# Patient Record
Sex: FEMALE | Race: WHITE | NOT HISPANIC OR LATINO | URBAN - METROPOLITAN AREA
[De-identification: names, ages, dates, MRNs, and addresses within clinical notes are randomized per-mention and may not be internally consistent; named-entity substitution may affect disease eponyms.]

---

## 2017-01-10 ENCOUNTER — EMERGENCY (EMERGENCY)
Facility: HOSPITAL | Age: 82
LOS: 1 days | Discharge: HOME | End: 2017-01-10
Attending: EMERGENCY MEDICINE | Admitting: EMERGENCY MEDICINE
Payer: MEDICARE

## 2017-01-10 VITALS
HEART RATE: 83 BPM | RESPIRATION RATE: 20 BRPM | DIASTOLIC BLOOD PRESSURE: 76 MMHG | OXYGEN SATURATION: 100 % | SYSTOLIC BLOOD PRESSURE: 154 MMHG

## 2017-01-10 VITALS — WEIGHT: 156.09 LBS

## 2017-01-10 DIAGNOSIS — F32.9 MAJOR DEPRESSIVE DISORDER, SINGLE EPISODE, UNSPECIFIED: ICD-10-CM

## 2017-01-10 DIAGNOSIS — E07.89 OTHER SPECIFIED DISORDERS OF THYROID: ICD-10-CM

## 2017-01-10 DIAGNOSIS — R07.89 OTHER CHEST PAIN: ICD-10-CM

## 2017-01-10 DIAGNOSIS — E86.0 DEHYDRATION: ICD-10-CM

## 2017-01-10 DIAGNOSIS — E78.5 HYPERLIPIDEMIA, UNSPECIFIED: ICD-10-CM

## 2017-01-10 DIAGNOSIS — N30.00 ACUTE CYSTITIS WITHOUT HEMATURIA: ICD-10-CM

## 2017-01-10 DIAGNOSIS — E11.9 TYPE 2 DIABETES MELLITUS WITHOUT COMPLICATIONS: ICD-10-CM

## 2017-01-10 DIAGNOSIS — I10 ESSENTIAL (PRIMARY) HYPERTENSION: ICD-10-CM

## 2017-01-10 LAB
ALBUMIN SERPL ELPH-MCNC: 4.5 G/DL — SIGNIFICANT CHANGE UP (ref 3.3–5.2)
ALP SERPL-CCNC: 92 U/L — SIGNIFICANT CHANGE UP (ref 40–120)
ALT FLD-CCNC: 13 U/L — SIGNIFICANT CHANGE UP
ANION GAP SERPL CALC-SCNC: 17 MMOL/L — SIGNIFICANT CHANGE UP (ref 5–17)
APPEARANCE UR: CLEAR — SIGNIFICANT CHANGE UP
APTT BLD: 30.6 SEC — SIGNIFICANT CHANGE UP (ref 27.5–37.4)
AST SERPL-CCNC: 13 U/L — SIGNIFICANT CHANGE UP
BACTERIA # UR AUTO: ABNORMAL
BASOPHILS # BLD AUTO: 0.1 K/UL — SIGNIFICANT CHANGE UP (ref 0–0.2)
BASOPHILS NFR BLD AUTO: 1 % — SIGNIFICANT CHANGE UP (ref 0–2)
BILIRUB SERPL-MCNC: 0.4 MG/DL — SIGNIFICANT CHANGE UP (ref 0.4–2)
BILIRUB UR-MCNC: NEGATIVE — SIGNIFICANT CHANGE UP
BUN SERPL-MCNC: 24 MG/DL — HIGH (ref 8–20)
CALCIUM SERPL-MCNC: 9.2 MG/DL — SIGNIFICANT CHANGE UP (ref 8.6–10.2)
CHLORIDE SERPL-SCNC: 93 MMOL/L — LOW (ref 98–107)
CO2 SERPL-SCNC: 20 MMOL/L — LOW (ref 22–29)
COLOR SPEC: YELLOW — SIGNIFICANT CHANGE UP
COMMENT - URINE: SIGNIFICANT CHANGE UP
CREAT SERPL-MCNC: 1.39 MG/DL — HIGH (ref 0.5–1.3)
DIFF PNL FLD: ABNORMAL
EOSINOPHIL # BLD AUTO: 0.3 K/UL — SIGNIFICANT CHANGE UP (ref 0–0.5)
EOSINOPHIL NFR BLD AUTO: 2 % — SIGNIFICANT CHANGE UP (ref 0–5)
EPI CELLS # UR: SIGNIFICANT CHANGE UP
GLUCOSE SERPL-MCNC: 152 MG/DL — HIGH (ref 70–115)
GLUCOSE UR QL: NEGATIVE MG/DL — SIGNIFICANT CHANGE UP
HCT VFR BLD CALC: 32.8 % — LOW (ref 37–47)
HGB BLD-MCNC: 11 G/DL — LOW (ref 12–16)
INR BLD: 1.09 RATIO — SIGNIFICANT CHANGE UP (ref 0.88–1.16)
KETONES UR-MCNC: NEGATIVE — SIGNIFICANT CHANGE UP
LEUKOCYTE ESTERASE UR-ACNC: ABNORMAL
LIDOCAIN IGE QN: 35 U/L — SIGNIFICANT CHANGE UP (ref 22–51)
LYMPHOCYTES # BLD AUTO: 31 % — SIGNIFICANT CHANGE UP (ref 20–55)
LYMPHOCYTES # BLD AUTO: 5.6 K/UL — HIGH (ref 1–4.8)
MCHC RBC-ENTMCNC: 29.9 PG — SIGNIFICANT CHANGE UP (ref 27–31)
MCHC RBC-ENTMCNC: 33.5 G/DL — SIGNIFICANT CHANGE UP (ref 32–36)
MCV RBC AUTO: 89.1 FL — SIGNIFICANT CHANGE UP (ref 81–99)
MONOCYTES # BLD AUTO: 1.2 K/UL — HIGH (ref 0–0.8)
MONOCYTES NFR BLD AUTO: 8 % — SIGNIFICANT CHANGE UP (ref 3–10)
NEUTROPHILS # BLD AUTO: 8.7 K/UL — HIGH (ref 1.8–8)
NEUTROPHILS NFR BLD AUTO: 56 % — SIGNIFICANT CHANGE UP (ref 37–73)
NEUTS BAND # BLD: 2 % — SIGNIFICANT CHANGE UP (ref 0–8)
NITRITE UR-MCNC: POSITIVE
NT-PROBNP SERPL-SCNC: 97 PG/ML — SIGNIFICANT CHANGE UP (ref 0–300)
PH UR: 5 — SIGNIFICANT CHANGE UP (ref 4.8–8)
PLAT MORPH BLD: NORMAL — SIGNIFICANT CHANGE UP
PLATELET # BLD AUTO: 453 K/UL — HIGH (ref 150–400)
POIKILOCYTOSIS BLD QL AUTO: SLIGHT — SIGNIFICANT CHANGE UP
POTASSIUM SERPL-MCNC: 4.3 MMOL/L — SIGNIFICANT CHANGE UP (ref 3.5–5.3)
POTASSIUM SERPL-SCNC: 4.3 MMOL/L — SIGNIFICANT CHANGE UP (ref 3.5–5.3)
PROT SERPL-MCNC: 7.8 G/DL — SIGNIFICANT CHANGE UP (ref 6.6–8.7)
PROT UR-MCNC: 15 MG/DL
PROTHROM AB SERPL-ACNC: 12 SEC — SIGNIFICANT CHANGE UP (ref 10–13.1)
RBC # BLD: 3.68 M/UL — LOW (ref 4.4–5.2)
RBC # FLD: 13.5 % — SIGNIFICANT CHANGE UP (ref 11–15.6)
RBC BLD AUTO: ABNORMAL
RBC CASTS # UR COMP ASSIST: ABNORMAL /HPF (ref 0–4)
SODIUM SERPL-SCNC: 130 MMOL/L — LOW (ref 135–145)
SP GR SPEC: 1.01 — SIGNIFICANT CHANGE UP (ref 1.01–1.02)
TROPONIN T SERPL-MCNC: <0.01 NG/ML — SIGNIFICANT CHANGE UP (ref 0–0.06)
UROBILINOGEN FLD QL: NEGATIVE MG/DL — SIGNIFICANT CHANGE UP
WBC # BLD: 15.84 K/UL — HIGH (ref 4.8–10.8)
WBC # FLD AUTO: 15.84 K/UL — HIGH (ref 4.8–10.8)
WBC UR QL: ABNORMAL

## 2017-01-10 PROCEDURE — 81001 URINALYSIS AUTO W/SCOPE: CPT

## 2017-01-10 PROCEDURE — 93010 ELECTROCARDIOGRAM REPORT: CPT

## 2017-01-10 PROCEDURE — 85027 COMPLETE CBC AUTOMATED: CPT

## 2017-01-10 PROCEDURE — 99284 EMERGENCY DEPT VISIT MOD MDM: CPT | Mod: 25

## 2017-01-10 PROCEDURE — 93005 ELECTROCARDIOGRAM TRACING: CPT

## 2017-01-10 PROCEDURE — 83880 ASSAY OF NATRIURETIC PEPTIDE: CPT

## 2017-01-10 PROCEDURE — 71010: CPT | Mod: 26

## 2017-01-10 PROCEDURE — 83690 ASSAY OF LIPASE: CPT

## 2017-01-10 PROCEDURE — 96374 THER/PROPH/DIAG INJ IV PUSH: CPT

## 2017-01-10 PROCEDURE — 84484 ASSAY OF TROPONIN QUANT: CPT

## 2017-01-10 PROCEDURE — 71045 X-RAY EXAM CHEST 1 VIEW: CPT

## 2017-01-10 PROCEDURE — 99285 EMERGENCY DEPT VISIT HI MDM: CPT

## 2017-01-10 PROCEDURE — 85610 PROTHROMBIN TIME: CPT

## 2017-01-10 PROCEDURE — 80053 COMPREHEN METABOLIC PANEL: CPT

## 2017-01-10 PROCEDURE — 85730 THROMBOPLASTIN TIME PARTIAL: CPT

## 2017-01-10 RX ORDER — NITROFURANTOIN MACROCRYSTAL 50 MG
1 CAPSULE ORAL
Qty: 14 | Refills: 0 | OUTPATIENT
Start: 2017-01-10 | End: 2017-01-17

## 2017-01-10 RX ORDER — NITROFURANTOIN MACROCRYSTAL 50 MG
1 CAPSULE ORAL
Qty: 20 | Refills: 0 | OUTPATIENT
Start: 2017-01-10 | End: 2017-01-20

## 2017-01-10 RX ORDER — SODIUM CHLORIDE 9 MG/ML
1000 INJECTION INTRAMUSCULAR; INTRAVENOUS; SUBCUTANEOUS ONCE
Qty: 0 | Refills: 0 | Status: COMPLETED | OUTPATIENT
Start: 2017-01-10 | End: 2017-01-10

## 2017-01-10 RX ORDER — CEFTRIAXONE 500 MG/1
1 INJECTION, POWDER, FOR SOLUTION INTRAMUSCULAR; INTRAVENOUS ONCE
Qty: 0 | Refills: 0 | Status: COMPLETED | OUTPATIENT
Start: 2017-01-10 | End: 2017-01-10

## 2017-01-10 RX ORDER — ESCITALOPRAM OXALATE 10 MG/1
0 TABLET, FILM COATED ORAL
Qty: 0 | Refills: 0 | COMMUNITY

## 2017-01-10 RX ORDER — SODIUM CHLORIDE 9 MG/ML
3 INJECTION INTRAMUSCULAR; INTRAVENOUS; SUBCUTANEOUS ONCE
Qty: 0 | Refills: 0 | Status: COMPLETED | OUTPATIENT
Start: 2017-01-10 | End: 2017-01-10

## 2017-01-10 RX ORDER — ATORVASTATIN CALCIUM 80 MG/1
0 TABLET, FILM COATED ORAL
Qty: 0 | Refills: 0 | COMMUNITY

## 2017-01-10 RX ADMIN — SODIUM CHLORIDE 3 MILLILITER(S): 9 INJECTION INTRAMUSCULAR; INTRAVENOUS; SUBCUTANEOUS at 12:56

## 2017-01-10 RX ADMIN — CEFTRIAXONE 100 GRAM(S): 500 INJECTION, POWDER, FOR SOLUTION INTRAMUSCULAR; INTRAVENOUS at 15:07

## 2017-01-10 RX ADMIN — SODIUM CHLORIDE 1000 MILLILITER(S): 9 INJECTION INTRAMUSCULAR; INTRAVENOUS; SUBCUTANEOUS at 13:19

## 2017-01-10 NOTE — ED ADULT NURSE REASSESSMENT NOTE - NS ED NURSE REASSESS COMMENT FT1
pt resting comfortably, aware of plan of care, respirations even unlabored, aid at bedside. will continue to monitor.

## 2017-01-10 NOTE — ED PROVIDER NOTE - MEDICAL DECISION MAKING DETAILS
Patient with possible CP yesterday, unsure if she had it.  Patient with no fever, but does have an elevated WBC and positive urine for UTI and dehydration.  PAtient given IVF and IV abx.  Patient with 2 home aids at home, will give her plenty of fluids and give her abx.  Patient will f/u w/o fail.

## 2017-01-10 NOTE — ED ADULT NURSE NOTE - OBJECTIVE STATEMENT
received pt alert and oreintedx3, 2 home aids at bedside, as per aids normal baseline mental status. c/o intermittent chest pain since last night. non radiating no pain currently present. denies sob and difficulty breathing. MAEx4, respirations even unlabored. will continue to monitor.

## 2017-01-10 NOTE — ED ADULT NURSE NOTE - CHPI ED SYMPTOMS NEG
no vomiting/no left arm pain/no chest discomfort/no chest wall tenderness/no diaphoresis/no edema/no nausea/no back pain/no shortness of breath/no cough

## 2017-01-10 NOTE — ED PROVIDER NOTE - OBJECTIVE STATEMENT
PAtient is a 82yo female with h/o dementia and has two home care takers at home with her and when they arrived this morning the patient stated that she had an epidsode of chest pain last night, but is now resolved.  Patient in ED states she does not remember having CP last night and feels well with no complaints currently.  The aids told her to come to the ED to be "checked out" because she had said had CP when they first arrived, even though she denies currently.

## 2018-04-10 ENCOUNTER — APPOINTMENT (OUTPATIENT)
Dept: NEUROLOGY | Facility: CLINIC | Age: 83
End: 2018-04-10
Payer: MEDICARE

## 2018-04-10 VITALS
DIASTOLIC BLOOD PRESSURE: 60 MMHG | HEIGHT: 64 IN | WEIGHT: 150 LBS | BODY MASS INDEX: 25.61 KG/M2 | SYSTOLIC BLOOD PRESSURE: 120 MMHG

## 2018-04-10 DIAGNOSIS — F02.80 ALZHEIMER'S DISEASE WITH LATE ONSET: ICD-10-CM

## 2018-04-10 DIAGNOSIS — G30.1 ALZHEIMER'S DISEASE WITH LATE ONSET: ICD-10-CM

## 2018-04-10 DIAGNOSIS — F51.01 PRIMARY INSOMNIA: ICD-10-CM

## 2018-04-10 DIAGNOSIS — Z86.39 PERSONAL HISTORY OF OTHER ENDOCRINE, NUTRITIONAL AND METABOLIC DISEASE: ICD-10-CM

## 2018-04-10 DIAGNOSIS — Z86.79 PERSONAL HISTORY OF OTHER DISEASES OF THE CIRCULATORY SYSTEM: ICD-10-CM

## 2018-04-10 PROCEDURE — 99204 OFFICE O/P NEW MOD 45 MIN: CPT

## 2018-04-10 RX ORDER — METOPROLOL SUCCINATE 50 MG/1
50 TABLET, EXTENDED RELEASE ORAL
Qty: 90 | Refills: 0 | Status: ACTIVE | COMMUNITY
Start: 2018-02-13

## 2018-04-10 RX ORDER — TRAZODONE HYDROCHLORIDE 50 MG/1
50 TABLET ORAL
Qty: 30 | Refills: 3 | Status: ACTIVE | COMMUNITY
Start: 2018-04-10 | End: 1900-01-01

## 2018-04-10 RX ORDER — NIFEDIPINE 60 MG/1
60 TABLET, FILM COATED, EXTENDED RELEASE ORAL
Qty: 90 | Refills: 0 | Status: ACTIVE | COMMUNITY
Start: 2018-03-20

## 2018-04-10 RX ORDER — RAMIPRIL 10 MG/1
10 CAPSULE ORAL
Qty: 90 | Refills: 0 | Status: ACTIVE | COMMUNITY
Start: 2017-10-16

## 2018-04-10 RX ORDER — ATORVASTATIN CALCIUM 10 MG/1
10 TABLET, FILM COATED ORAL
Qty: 90 | Refills: 0 | Status: ACTIVE | COMMUNITY
Start: 2018-03-20

## 2018-04-10 RX ORDER — METFORMIN HYDROCHLORIDE 1000 MG/1
1000 TABLET, COATED ORAL
Qty: 180 | Refills: 0 | Status: ACTIVE | COMMUNITY
Start: 2017-11-01

## 2018-04-10 RX ORDER — ESCITALOPRAM OXALATE 20 MG/1
20 TABLET ORAL
Qty: 90 | Refills: 0 | Status: ACTIVE | COMMUNITY
Start: 2018-04-06

## 2018-05-22 ENCOUNTER — APPOINTMENT (OUTPATIENT)
Dept: NEUROLOGY | Facility: CLINIC | Age: 83
End: 2018-05-22

## 2018-08-27 ENCOUNTER — RX RENEWAL (OUTPATIENT)
Age: 83
End: 2018-08-27

## 2019-01-12 ENCOUNTER — EMERGENCY (EMERGENCY)
Facility: HOSPITAL | Age: 84
LOS: 1 days | Discharge: HOME | End: 2019-01-12
Attending: EMERGENCY MEDICINE
Payer: MEDICARE

## 2019-01-12 VITALS
HEART RATE: 84 BPM | HEIGHT: 65 IN | OXYGEN SATURATION: 100 % | SYSTOLIC BLOOD PRESSURE: 123 MMHG | RESPIRATION RATE: 16 BRPM | DIASTOLIC BLOOD PRESSURE: 65 MMHG | WEIGHT: 145.06 LBS | TEMPERATURE: 99 F

## 2019-01-12 PROCEDURE — 70450 CT HEAD/BRAIN W/O DYE: CPT

## 2019-01-12 PROCEDURE — 73080 X-RAY EXAM OF ELBOW: CPT | Mod: 26,RT

## 2019-01-12 PROCEDURE — 73060 X-RAY EXAM OF HUMERUS: CPT | Mod: 26,RT

## 2019-01-12 PROCEDURE — 73110 X-RAY EXAM OF WRIST: CPT | Mod: 26,RT

## 2019-01-12 PROCEDURE — 72125 CT NECK SPINE W/O DYE: CPT

## 2019-01-12 PROCEDURE — 73080 X-RAY EXAM OF ELBOW: CPT

## 2019-01-12 PROCEDURE — 73030 X-RAY EXAM OF SHOULDER: CPT

## 2019-01-12 PROCEDURE — 73030 X-RAY EXAM OF SHOULDER: CPT | Mod: 26,RT

## 2019-01-12 PROCEDURE — 73090 X-RAY EXAM OF FOREARM: CPT | Mod: 26,LT

## 2019-01-12 PROCEDURE — 73090 X-RAY EXAM OF FOREARM: CPT

## 2019-01-12 PROCEDURE — 70450 CT HEAD/BRAIN W/O DYE: CPT | Mod: 26

## 2019-01-12 PROCEDURE — 73060 X-RAY EXAM OF HUMERUS: CPT

## 2019-01-12 PROCEDURE — 99284 EMERGENCY DEPT VISIT MOD MDM: CPT

## 2019-01-12 PROCEDURE — 72125 CT NECK SPINE W/O DYE: CPT | Mod: 26

## 2019-01-12 PROCEDURE — 99284 EMERGENCY DEPT VISIT MOD MDM: CPT | Mod: 25

## 2019-01-12 PROCEDURE — 73110 X-RAY EXAM OF WRIST: CPT

## 2019-01-12 RX ORDER — ACETAMINOPHEN 500 MG
650 TABLET ORAL ONCE
Qty: 0 | Refills: 0 | Status: COMPLETED | OUTPATIENT
Start: 2019-01-12 | End: 2019-01-12

## 2019-01-12 RX ORDER — IBUPROFEN 200 MG
1 TABLET ORAL
Qty: 9 | Refills: 0
Start: 2019-01-12 | End: 2019-01-14

## 2019-01-12 RX ADMIN — Medication 650 MILLIGRAM(S): at 11:56

## 2019-01-12 NOTE — ED ADULT NURSE NOTE - NS ED NOTE  TALK SOMEONE YN
Important Medication Changes:none, continue current medications      Further testing to be done:none        Next follow up visit: In office in one year              HERE IS SOME IMPORTANT INFORMATION FOR OUR PATIENTS    If you need refills- call your pharmacist and they will contact our office.    If you have medical concerns after hours call [phone].    If you have a question during office hours call  and to make appointment [phone]. You will eventually speak with my nurses. If you need to speak to me directly, let them know and I will get back to you as soon as possible.  Better yet, you can consider signing up for [employer], our popular online communication portal to schedule an appointment, ask for a refill, or contact our staff. Go to:  [email or web address]    Carson Payne MD     No

## 2019-01-12 NOTE — ED STATDOCS - OBJECTIVE STATEMENT
82 y/o F pt with hx of dementia, DM, HTN and HLD presents to ED with son s/p fall c/o R shoulder/arm pain. Pt's son notes that pt has been having difficulty sleeping for the past 2 days. Per aide reports that pt had a fall PTA, states pt was  at home when her phone rang so pt got up, and stumbled, causing her to fall onto her R side, arm/shoulder.  Pt admits to feeling dizzy before her fall, and states she was feeling somewhat sick this past week. Pt ambulates with a cane/walker at home. Pt's son states that pt has baseline dementia but is at her current baseline in ED. Pt has been eating and taking in fluids normally as per care worker. Per care worker states that pt has not had any falls in the past. Pt currently lives with a home care worker. NKDA. Denies use of blood thinners, head trauma, abdominal pain, chest pain, difficulty breathing, and LOC. No further complaints at this time. 84 y/o F pt with hx of dementia, DM, HTN and HLD presents to ED with son s/p fall c/o R shoulder/arm pain. Pt's son notes that pt has been having difficulty sleeping for the past 2 days. Per aide reports that pt had a fall PTA, states pt was  at home when her phone rang so pt got up, and stumbled, causing her to fall onto her R side, arm/shoulder.  Pt ambulates with a cane/walker at home. Pt's son states that pt has baseline dementia but is at her current baseline in ED. Pt has been eating and taking in fluids normally as per care worker. Per care worker states that pt has not had any falls in the past. Pt currently lives with a home care worker. NKDA. Denies use of blood thinners, head trauma, abdominal pain, chest pain, difficulty breathing, and LOC. No further complaints at this time.

## 2019-01-12 NOTE — ED STATDOCS - MUSCULOSKELETAL, MLM
R shoulder/forearm/elbow tenderness, R forearm R elbow,. Moving all extremities, able to ambulate. Full ROM at her elbow.

## 2019-01-12 NOTE — ED STATDOCS - ATTENDING CONTRIBUTION TO CARE
I, Clive Gonzalez, performed the initial face to face bedside interview with this patient regarding history of present illness, review of symptoms and relevant past medical, social and family history.  I completed an independent physical examination.  I was the initial provider who evaluated this patient. I have signed out the follow up of any pending tests (i.e. labs, radiological studies) to the ACP.  I have communicated the patient’s plan of care and disposition with the ACP.

## 2019-01-12 NOTE — ED ADULT NURSE NOTE - NSIMPLEMENTINTERV_GEN_ALL_ED
Implemented All Fall Risk Interventions:  [place] to call system. Call bell, personal items and telephone within reach. Instruct patient to call for assistance. Room bathroom lighting operational. Non-slip footwear when patient is off stretcher. Physically safe environment: no spills, clutter or unnecessary equipment. Stretcher in lowest position, wheels locked, appropriate side rails in place. Provide visual cue, wrist band, yellow gown, etc. Monitor gait and stability. Monitor for mental status changes and reorient to person, place, and time. Review medications for side effects contributing to fall risk. Reinforce activity limits and safety measures with patient and family.

## 2019-01-12 NOTE — ED STATDOCS - PROGRESS NOTE DETAILS
PA Note: PT evaluated by intake physician. HPI/PE/ROS as noted above. Will follow up plan per intake physician. Xray showing evidence of humerus fx. Pt given sling and instructed pt and caregiver to follow up with orthopedic doctor. Pt/caregiver instructed to take Tylenol for pain. CT head negative for hemorrhage. Xray showing evidence of humerus fx. Pt given sling and instructed pt and caregiver to follow up with orthopedic doctor. Pt prescribed Motrin for pain. CT head negative for hemorrhage.

## 2019-01-12 NOTE — ED ADULT NURSE NOTE - OBJECTIVE STATEMENT
pt states she fell at home this morning on her right shoulder. pt walks with cane at home, tripped and fell this am. pt moving all extremities well, skin warm dry and intact. pt denies LOC or hitting head.

## 2019-01-17 ENCOUNTER — APPOINTMENT (OUTPATIENT)
Dept: ORTHOPEDIC SURGERY | Facility: CLINIC | Age: 84
End: 2019-01-17
Payer: MEDICARE

## 2019-01-17 VITALS
BODY MASS INDEX: 25.61 KG/M2 | HEART RATE: 90 BPM | HEIGHT: 64 IN | WEIGHT: 150 LBS | SYSTOLIC BLOOD PRESSURE: 116 MMHG | DIASTOLIC BLOOD PRESSURE: 68 MMHG

## 2019-01-17 DIAGNOSIS — Z78.9 OTHER SPECIFIED HEALTH STATUS: ICD-10-CM

## 2019-01-17 PROCEDURE — 73030 X-RAY EXAM OF SHOULDER: CPT | Mod: RT

## 2019-01-17 PROCEDURE — 99204 OFFICE O/P NEW MOD 45 MIN: CPT

## 2019-01-17 NOTE — PHYSICAL EXAM
[de-identified] : Patient is well appearing, in non acute distress with nonlabored breathing.  Patient has memory issues and not able to provide good history. Extra-ocular movements intact and no nasal discharge.\par \par Right UE warm and well perfused; palpable radial pulse\par SILT C5-T1\par motor intact to finger flexion, wrist extension and flexion, elbow flexion and extension, supination and pronation, deltoid\par Patient has limited motion secondary to pain\par Significant bruising ecchymosis and variable color discoloration of the upper forearm particularly anteriorly\par Diffusely tender to palpation throughout the entire upper extremity\par Flattening of the deltoid but is able to fire\par \par Left UE warm and well perfused; palpable radial pulse\par SILT C5-T1\par motor intact to finger flexion, wrist extension and flexion, elbow flexion and extension, supination and pronation, deltoid\par  [de-identified] : Patient had x-rays at [facility] in January 12, 2019 and the images are visualized reviewed by me and the findings are below:\par Impacted right humeral neck fracture\par \par Patient had no noted fracture of her elbow, wrist, or forearm per radiology reports and visualization by me, on x-rays of above-mentioned body parts, performed at [facility] on January 12, 2019\par \par Patient had 3 views of her right shoulder obtained today in our office and they were visualized reviewed by me: \par Patient has minimally displaced femoral neck fracture\par There appears to be inferior subluxation of the humeral head without obvious dislocation

## 2019-01-17 NOTE — HISTORY OF PRESENT ILLNESS
[de-identified] : Ms. UGALDE is a 83 year old female who presents with Right shoulder pain. Patient states tripped and fell at home, landing on her Right shoulder, on 19. She then was taken to [facility] DOS: 19.  Patient with dementia and has 24 hour care.  Can feed self and brush teeth but needs assistance with everything else.  She was given sling at [facility].  She has taken off sling.  Uses hand but not shoulder.  \par \par Hand Dominance: RHD\par Location: Right shoulder\par Timin19\par Quality: aching, intermittent\par Severity:9/10\par Treatments Tried:ibuprofen\par Associated Signs and Symptoms: some burning, tingling\par What makes it worse: movement, lifting\par What makes it better: rest \par Improving/Worsening/Unchanged: unchanged\par PT hx:no pt\par

## 2019-01-17 NOTE — ASSESSMENT
[FreeTextEntry1] : Patient is an 83-year-old female with dementia who is 5 days status post a right two-part proximal humerus fracture with minimal displacement. Patient has a 24-hour home health aid and presents with her today as well as her health care proxy who is a cousin. I discussed the x-ray findings today as well as her diagnosis, prognosis, and treatment plan with patient, healthcare feed, and healthcare proxy.  I recommend patient to maintain her sling for at least 6 weeks to allow her fracture to heal without further displacement. I recommended a sling and bolster and this was provided in the office. I've encouraged them to palpation range of motion in the wrist and elbow, with her elbow at her side, to prevent stiffness. I like patient to followup next week with repeat x-rays to make sure no further displacement. Patient may take over-the-counter Tylenol for pain provider no contraindications from her PCP for which her healthcare proxy states they're non-and he denies liver issues for her. We'll see patient back in one week.

## 2019-01-17 NOTE — CONSULT LETTER
[Dear  ___] : Dear  [unfilled], [Consult Letter:] : I had the pleasure of evaluating your patient, [unfilled]. [( Thank you for referring [unfilled] for consultation for _____ )] : Thank you for referring [unfilled] for consultation for [unfilled] [Please see my note below.] : Please see my note below. [Consult Closing:] : Thank you very much for allowing me to participate in the care of this patient.  If you have any questions, please do not hesitate to contact me. [Sincerely,] : Sincerely, [FreeTextEntry2] : DARIELA MEYER\par  [FreeTextEntry3] : Nara Chand MD\par [facility] [place] [facility]\par [address]\par [place] [id number]\par Tel [phone]\par

## 2019-01-24 ENCOUNTER — APPOINTMENT (OUTPATIENT)
Dept: ORTHOPEDIC SURGERY | Facility: CLINIC | Age: 84
End: 2019-01-24
Payer: MEDICARE

## 2019-01-24 VITALS
WEIGHT: 150 LBS | HEIGHT: 64 IN | HEART RATE: 113 BPM | DIASTOLIC BLOOD PRESSURE: 81 MMHG | SYSTOLIC BLOOD PRESSURE: 142 MMHG | BODY MASS INDEX: 25.61 KG/M2

## 2019-01-24 PROCEDURE — 99213 OFFICE O/P EST LOW 20 MIN: CPT

## 2019-01-24 PROCEDURE — 73030 X-RAY EXAM OF SHOULDER: CPT | Mod: RT

## 2019-01-24 NOTE — HISTORY OF PRESENT ILLNESS
[Pain Location] : pain [] : right shoulder [de-identified] : Ms. UGALDE is a 83 year old female who is being seen for Follow-up of Right shoulder pain. DOI: 1/12/19. She presents with cousin and care taker again.  They state she has been compliant with sling and ROM exercises distally.  Patient without complaints.\par

## 2019-01-24 NOTE — REVIEW OF SYSTEMS
[Joint Pain] : joint pain [Discharge] : no discharge [Cough] : no cough [FreeTextEntry9] : Right shoulder

## 2019-01-24 NOTE — PHYSICAL EXAM
[de-identified] : Patient is well appearing, in non acute distress with nonlabored breathing and not AOx3. Extra-ocular movements intact and no nasal discharge.\par \par Right UE warm and well perfused; palpable radial pulse\par SILT C5-T1\par motor intact to finger flexion, wrist extension and flexion, elbow flexion and extension, supination and pronation, deltoid\par Ecchymosis of right upper arm is resolving\par Patient has full passive range of motion of her elbow and full active range of motion of her fingers and wrist\par Positive tenderness to palpation at areas of ecchymosis, throughout upper arm, and most tender at the proximal humerus\par \par Left UE warm and well perfused; palpable radial pulse\par SILT C5-T1\par motor intact to finger flexion, wrist extension and flexion, elbow flexion and extension, supination and pronation, deltoid\par  [de-identified] : 3 views of right shoudler obtained and reviewed by me:\par minimally displaced proximal humeral neck fracture with no significiant change in displacement\par inferior subluxation of humeral head

## 2019-01-24 NOTE — ASSESSMENT
[FreeTextEntry1] : Patient is an 83-year-old female with signs and symptoms consistent with minimally displaced right proximal humerus fracture at the humeral neck.Discussed findings and diagnosis and the risks, benefits, and alternatives of all treatment options. Patient is recommended to continue wearing the sling for immobilization for an additional 4 weeks but she may come out of it, with the elbow at the side, to range elbow, wrists, and fingers. Again was discussed the x-ray finding of the inferior subluxation of the humeral head. Patient will follow up in 4 weeks or if there's any questions or concerns before then, patient is welcomed to come in.

## 2019-02-21 ENCOUNTER — APPOINTMENT (OUTPATIENT)
Dept: ORTHOPEDIC SURGERY | Facility: CLINIC | Age: 84
End: 2019-02-21
Payer: MEDICARE

## 2019-02-21 VITALS
BODY MASS INDEX: 25.61 KG/M2 | HEART RATE: 71 BPM | SYSTOLIC BLOOD PRESSURE: 120 MMHG | WEIGHT: 150 LBS | HEIGHT: 64 IN | DIASTOLIC BLOOD PRESSURE: 62 MMHG

## 2019-02-21 DIAGNOSIS — M25.621 STIFFNESS OF RIGHT ELBOW, NOT ELSEWHERE CLASSIFIED: ICD-10-CM

## 2019-02-21 PROCEDURE — 99213 OFFICE O/P EST LOW 20 MIN: CPT

## 2019-02-21 PROCEDURE — 73080 X-RAY EXAM OF ELBOW: CPT | Mod: RT

## 2019-02-21 PROCEDURE — 73030 X-RAY EXAM OF SHOULDER: CPT | Mod: RT

## 2019-02-21 NOTE — ASSESSMENT
[FreeTextEntry1] : Patient is an 83-year-old female who is nearly 6 weeks status post right proximal humerus fracture and with elbow stiffness. Discussed findings and diagnosis and the risks, benefits, and alternatives of all treatment options. Patient may wean out of the sling over the next 2 weeks. It is okay to wear at nighttime p.r.n. after this point. I have ordered physical therapy for patient to begin range of motion as tolerated of right elbow. I've also recommended gentle range of motion of shoulder as tolerated but no strengthening. Patient will follow up in 6 weeks, however, if any concerns she may return sooner.

## 2019-02-21 NOTE — REASON FOR VISIT
[Follow-Up Visit] : a follow-up visit for [Other: ____] : [unfilled] [Family Member] : family member [Formal Caregiver] : formal caregiver

## 2019-02-21 NOTE — PHYSICAL EXAM
[de-identified] : Patient is well appearing, in non acute distress with nonlabored breathing and appropriate mood and affect. Extra-ocular movements intact and no nasal discharge. Postivie memory issues.  \par \par Right UE warm and well perfused; palpable radial pulse\par SILT C5-T1\par motor intact to finger flexion, wrist extension and flexion, elbow flexion and extension, supination and pronation, deltoid\par Elbow range of motion, active and passive, is from ; full pronation supinatation\par Diffuse tenderness to palpation about the elbow that is mild\par \par Left UE warm and well perfused; palpable radial pulse\par SILT C5-T1\par motor intact to finger flexion, wrist extension and flexion, elbow flexion and extension, supination and pronation, deltoid\par \par Right Shoulder \par \par Appearance\par negative atrophy of musculature\par negative winging\par \par Tenderness to Palpation about the shoulder is positive at the proximal humerus\par \par Range of Motion: AAROM\par Forward Elevation: 70\par Abduction: 65\par ER at 0 degrees of abduction: -5\par ER at 90 degrees of abduction: 45\par Internal Rotation but\par \par  [de-identified] : Right shoulder 3 views:\par Sugical neck fracture, displaced, not signficiantly changed from prior images; callus noted; continues with inferior subluxation of humeral head\par Axillary view shows reduced joint\par \par Right Elbow Xray was visualized and reviewed by me\par well preserved joint spaces\par no fracture or dislocation\par mild cystic changes at lateral condyle

## 2019-02-21 NOTE — HISTORY OF PRESENT ILLNESS
[de-identified] : Ms. UGALDE is a 83 year old female who is being seen for Follow-up of Right shoulder pain. DOI: 1/12/19.  Presents with aide and family member.  She has been compliant with sling.  Without complaints.

## 2019-06-12 ENCOUNTER — APPOINTMENT (OUTPATIENT)
Dept: ORTHOPEDIC SURGERY | Facility: CLINIC | Age: 84
End: 2019-06-12
Payer: MEDICARE

## 2019-06-12 VITALS
WEIGHT: 150 LBS | SYSTOLIC BLOOD PRESSURE: 131 MMHG | DIASTOLIC BLOOD PRESSURE: 78 MMHG | BODY MASS INDEX: 25.61 KG/M2 | HEART RATE: 90 BPM | HEIGHT: 64 IN

## 2019-06-12 DIAGNOSIS — S42.201A UNSPECIFIED FRACTURE OF UPPER END OF RIGHT HUMERUS, INITIAL ENCOUNTER FOR CLOSED FRACTURE: ICD-10-CM

## 2019-06-12 PROCEDURE — 99212 OFFICE O/P EST SF 10 MIN: CPT

## 2019-06-12 PROCEDURE — 73030 X-RAY EXAM OF SHOULDER: CPT | Mod: RT

## 2020-05-30 ENCOUNTER — INPATIENT (INPATIENT)
Facility: HOSPITAL | Age: 85
LOS: 4 days | Discharge: HOME | DRG: 690 | End: 2020-06-04
Attending: INTERNAL MEDICINE | Admitting: INTERNAL MEDICINE
Payer: MEDICARE

## 2020-05-30 PROCEDURE — 99284 EMERGENCY DEPT VISIT MOD MDM: CPT | Mod: CS

## 2020-05-31 VITALS
TEMPERATURE: 98 F | HEART RATE: 83 BPM | RESPIRATION RATE: 16 BRPM | WEIGHT: 125 LBS | SYSTOLIC BLOOD PRESSURE: 199 MMHG | OXYGEN SATURATION: 100 % | DIASTOLIC BLOOD PRESSURE: 108 MMHG | HEIGHT: 63 IN

## 2020-05-31 DIAGNOSIS — R10.9 UNSPECIFIED ABDOMINAL PAIN: ICD-10-CM

## 2020-05-31 DIAGNOSIS — F03.90 UNSPECIFIED DEMENTIA WITHOUT BEHAVIORAL DISTURBANCE: ICD-10-CM

## 2020-05-31 DIAGNOSIS — R94.31 ABNORMAL ELECTROCARDIOGRAM [ECG] [EKG]: ICD-10-CM

## 2020-05-31 DIAGNOSIS — E78.5 HYPERLIPIDEMIA, UNSPECIFIED: ICD-10-CM

## 2020-05-31 DIAGNOSIS — N39.0 URINARY TRACT INFECTION, SITE NOT SPECIFIED: ICD-10-CM

## 2020-05-31 DIAGNOSIS — I10 ESSENTIAL (PRIMARY) HYPERTENSION: ICD-10-CM

## 2020-05-31 DIAGNOSIS — E11.9 TYPE 2 DIABETES MELLITUS WITHOUT COMPLICATIONS: ICD-10-CM

## 2020-05-31 LAB
ALBUMIN SERPL ELPH-MCNC: 4.1 G/DL — SIGNIFICANT CHANGE UP (ref 3.3–5.2)
ALBUMIN SERPL ELPH-MCNC: 4.7 G/DL — SIGNIFICANT CHANGE UP (ref 3.3–5.2)
ALP SERPL-CCNC: 74 U/L — SIGNIFICANT CHANGE UP (ref 40–120)
ALP SERPL-CCNC: 89 U/L — SIGNIFICANT CHANGE UP (ref 40–120)
ALT FLD-CCNC: 12 U/L — SIGNIFICANT CHANGE UP
ALT FLD-CCNC: 15 U/L — SIGNIFICANT CHANGE UP
ANION GAP SERPL CALC-SCNC: 17 MMOL/L — SIGNIFICANT CHANGE UP (ref 5–17)
ANION GAP SERPL CALC-SCNC: 21 MMOL/L — HIGH (ref 5–17)
APPEARANCE UR: CLEAR — SIGNIFICANT CHANGE UP
AST SERPL-CCNC: 15 U/L — SIGNIFICANT CHANGE UP
AST SERPL-CCNC: 17 U/L — SIGNIFICANT CHANGE UP
BACTERIA # UR AUTO: ABNORMAL
BASOPHILS # BLD AUTO: 0.09 K/UL — SIGNIFICANT CHANGE UP (ref 0–0.2)
BASOPHILS NFR BLD AUTO: 0.5 % — SIGNIFICANT CHANGE UP (ref 0–2)
BILIRUB SERPL-MCNC: 0.4 MG/DL — SIGNIFICANT CHANGE UP (ref 0.4–2)
BILIRUB SERPL-MCNC: 0.4 MG/DL — SIGNIFICANT CHANGE UP (ref 0.4–2)
BILIRUB UR-MCNC: NEGATIVE — SIGNIFICANT CHANGE UP
BLD GP AB SCN SERPL QL: SIGNIFICANT CHANGE UP
BUN SERPL-MCNC: 23 MG/DL — HIGH (ref 8–20)
BUN SERPL-MCNC: 29 MG/DL — HIGH (ref 8–20)
CALCIUM SERPL-MCNC: 8.4 MG/DL — LOW (ref 8.6–10.2)
CALCIUM SERPL-MCNC: 9.2 MG/DL — SIGNIFICANT CHANGE UP (ref 8.6–10.2)
CHLORIDE SERPL-SCNC: 89 MMOL/L — LOW (ref 98–107)
CHLORIDE SERPL-SCNC: 95 MMOL/L — LOW (ref 98–107)
CK SERPL-CCNC: 30 U/L — SIGNIFICANT CHANGE UP (ref 25–170)
CO2 SERPL-SCNC: 19 MMOL/L — LOW (ref 22–29)
CO2 SERPL-SCNC: 19 MMOL/L — LOW (ref 22–29)
COLOR SPEC: YELLOW — SIGNIFICANT CHANGE UP
CREAT SERPL-MCNC: 1.21 MG/DL — SIGNIFICANT CHANGE UP (ref 0.5–1.3)
CREAT SERPL-MCNC: 1.41 MG/DL — HIGH (ref 0.5–1.3)
DIFF PNL FLD: ABNORMAL
EOSINOPHIL # BLD AUTO: 0.06 K/UL — SIGNIFICANT CHANGE UP (ref 0–0.5)
EOSINOPHIL NFR BLD AUTO: 0.4 % — SIGNIFICANT CHANGE UP (ref 0–6)
EPI CELLS # UR: SIGNIFICANT CHANGE UP
ETHANOL SERPL-MCNC: <10 MG/DL — SIGNIFICANT CHANGE UP
GLUCOSE BLDC GLUCOMTR-MCNC: 109 MG/DL — HIGH (ref 70–99)
GLUCOSE BLDC GLUCOMTR-MCNC: 126 MG/DL — HIGH (ref 70–99)
GLUCOSE BLDC GLUCOMTR-MCNC: 190 MG/DL — HIGH (ref 70–99)
GLUCOSE SERPL-MCNC: 158 MG/DL — HIGH (ref 70–99)
GLUCOSE SERPL-MCNC: 230 MG/DL — HIGH (ref 70–99)
GLUCOSE UR QL: 100 MG/DL
HCT VFR BLD CALC: 31.1 % — LOW (ref 34.5–45)
HCT VFR BLD CALC: 34.8 % — SIGNIFICANT CHANGE UP (ref 34.5–45)
HGB BLD-MCNC: 10.8 G/DL — LOW (ref 11.5–15.5)
HGB BLD-MCNC: 12.1 G/DL — SIGNIFICANT CHANGE UP (ref 11.5–15.5)
IMM GRANULOCYTES NFR BLD AUTO: 0.8 % — SIGNIFICANT CHANGE UP (ref 0–1.5)
KETONES UR-MCNC: NEGATIVE — SIGNIFICANT CHANGE UP
LEUKOCYTE ESTERASE UR-ACNC: ABNORMAL
LIDOCAIN IGE QN: 38 U/L — SIGNIFICANT CHANGE UP (ref 22–51)
LYMPHOCYTES # BLD AUTO: 16.9 % — SIGNIFICANT CHANGE UP (ref 13–44)
LYMPHOCYTES # BLD AUTO: 2.87 K/UL — SIGNIFICANT CHANGE UP (ref 1–3.3)
MCHC RBC-ENTMCNC: 32 PG — SIGNIFICANT CHANGE UP (ref 27–34)
MCHC RBC-ENTMCNC: 32.1 PG — SIGNIFICANT CHANGE UP (ref 27–34)
MCHC RBC-ENTMCNC: 34.7 GM/DL — SIGNIFICANT CHANGE UP (ref 32–36)
MCHC RBC-ENTMCNC: 34.8 GM/DL — SIGNIFICANT CHANGE UP (ref 32–36)
MCV RBC AUTO: 92 FL — SIGNIFICANT CHANGE UP (ref 80–100)
MCV RBC AUTO: 92.3 FL — SIGNIFICANT CHANGE UP (ref 80–100)
MONOCYTES # BLD AUTO: 0.75 K/UL — SIGNIFICANT CHANGE UP (ref 0–0.9)
MONOCYTES NFR BLD AUTO: 4.4 % — SIGNIFICANT CHANGE UP (ref 2–14)
NEUTROPHILS # BLD AUTO: 13.06 K/UL — HIGH (ref 1.8–7.4)
NEUTROPHILS NFR BLD AUTO: 77 % — SIGNIFICANT CHANGE UP (ref 43–77)
NITRITE UR-MCNC: POSITIVE
PH UR: 7 — SIGNIFICANT CHANGE UP (ref 5–8)
PLATELET # BLD AUTO: 408 K/UL — HIGH (ref 150–400)
PLATELET # BLD AUTO: 446 K/UL — HIGH (ref 150–400)
POTASSIUM SERPL-MCNC: 4.1 MMOL/L — SIGNIFICANT CHANGE UP (ref 3.5–5.3)
POTASSIUM SERPL-MCNC: 4.3 MMOL/L — SIGNIFICANT CHANGE UP (ref 3.5–5.3)
POTASSIUM SERPL-SCNC: 4.1 MMOL/L — SIGNIFICANT CHANGE UP (ref 3.5–5.3)
POTASSIUM SERPL-SCNC: 4.3 MMOL/L — SIGNIFICANT CHANGE UP (ref 3.5–5.3)
PROT SERPL-MCNC: 6.7 G/DL — SIGNIFICANT CHANGE UP (ref 6.6–8.7)
PROT SERPL-MCNC: 7.6 G/DL — SIGNIFICANT CHANGE UP (ref 6.6–8.7)
PROT UR-MCNC: 100
RBC # BLD: 3.38 M/UL — LOW (ref 3.8–5.2)
RBC # BLD: 3.77 M/UL — LOW (ref 3.8–5.2)
RBC # FLD: 12.3 % — SIGNIFICANT CHANGE UP (ref 10.3–14.5)
RBC # FLD: 12.3 % — SIGNIFICANT CHANGE UP (ref 10.3–14.5)
RBC CASTS # UR COMP ASSIST: ABNORMAL /HPF (ref 0–4)
SARS-COV-2 RNA SPEC QL NAA+PROBE: SIGNIFICANT CHANGE UP
SODIUM SERPL-SCNC: 129 MMOL/L — LOW (ref 135–145)
SODIUM SERPL-SCNC: 131 MMOL/L — LOW (ref 135–145)
SP GR SPEC: 1 — LOW (ref 1.01–1.02)
TROPONIN T SERPL-MCNC: <0.01 NG/ML — SIGNIFICANT CHANGE UP (ref 0–0.06)
UROBILINOGEN FLD QL: NEGATIVE — SIGNIFICANT CHANGE UP
WBC # BLD: 16.44 K/UL — HIGH (ref 3.8–10.5)
WBC # BLD: 16.96 K/UL — HIGH (ref 3.8–10.5)
WBC # FLD AUTO: 16.44 K/UL — HIGH (ref 3.8–10.5)
WBC # FLD AUTO: 16.96 K/UL — HIGH (ref 3.8–10.5)
WBC UR QL: ABNORMAL

## 2020-05-31 PROCEDURE — 12345: CPT | Mod: NC

## 2020-05-31 PROCEDURE — 76705 ECHO EXAM OF ABDOMEN: CPT | Mod: 26

## 2020-05-31 PROCEDURE — 93010 ELECTROCARDIOGRAM REPORT: CPT

## 2020-05-31 PROCEDURE — 71045 X-RAY EXAM CHEST 1 VIEW: CPT | Mod: 26

## 2020-05-31 PROCEDURE — 74176 CT ABD & PELVIS W/O CONTRAST: CPT | Mod: 26

## 2020-05-31 PROCEDURE — 70450 CT HEAD/BRAIN W/O DYE: CPT | Mod: 26

## 2020-05-31 PROCEDURE — 99222 1ST HOSP IP/OBS MODERATE 55: CPT

## 2020-05-31 PROCEDURE — 99223 1ST HOSP IP/OBS HIGH 75: CPT

## 2020-05-31 RX ORDER — ATORVASTATIN CALCIUM 80 MG/1
10 TABLET, FILM COATED ORAL AT BEDTIME
Refills: 0 | Status: DISCONTINUED | OUTPATIENT
Start: 2020-05-31 | End: 2020-06-04

## 2020-05-31 RX ORDER — DEXTROSE 50 % IN WATER 50 %
12.5 SYRINGE (ML) INTRAVENOUS ONCE
Refills: 0 | Status: DISCONTINUED | OUTPATIENT
Start: 2020-05-31 | End: 2020-06-04

## 2020-05-31 RX ORDER — ASPIRIN/CALCIUM CARB/MAGNESIUM 324 MG
325 TABLET ORAL ONCE
Refills: 0 | Status: COMPLETED | OUTPATIENT
Start: 2020-05-31 | End: 2020-05-31

## 2020-05-31 RX ORDER — METOPROLOL TARTRATE 50 MG
50 TABLET ORAL DAILY
Refills: 0 | Status: DISCONTINUED | OUTPATIENT
Start: 2020-05-31 | End: 2020-05-31

## 2020-05-31 RX ORDER — ASPIRIN/CALCIUM CARB/MAGNESIUM 324 MG
81 TABLET ORAL DAILY
Refills: 0 | Status: DISCONTINUED | OUTPATIENT
Start: 2020-06-01 | End: 2020-06-04

## 2020-05-31 RX ORDER — SODIUM CHLORIDE 9 MG/ML
1000 INJECTION, SOLUTION INTRAVENOUS
Refills: 0 | Status: DISCONTINUED | OUTPATIENT
Start: 2020-05-31 | End: 2020-06-04

## 2020-05-31 RX ORDER — DEXTROSE 50 % IN WATER 50 %
15 SYRINGE (ML) INTRAVENOUS ONCE
Refills: 0 | Status: DISCONTINUED | OUTPATIENT
Start: 2020-05-31 | End: 2020-06-04

## 2020-05-31 RX ORDER — PIPERACILLIN AND TAZOBACTAM 4; .5 G/20ML; G/20ML
3.38 INJECTION, POWDER, LYOPHILIZED, FOR SOLUTION INTRAVENOUS EVERY 8 HOURS
Refills: 0 | Status: DISCONTINUED | OUTPATIENT
Start: 2020-05-31 | End: 2020-06-02

## 2020-05-31 RX ORDER — CEFTRIAXONE 500 MG/1
1000 INJECTION, POWDER, FOR SOLUTION INTRAMUSCULAR; INTRAVENOUS ONCE
Refills: 0 | Status: DISCONTINUED | OUTPATIENT
Start: 2020-05-31 | End: 2020-05-31

## 2020-05-31 RX ORDER — ESCITALOPRAM OXALATE 10 MG/1
20 TABLET, FILM COATED ORAL DAILY
Refills: 0 | Status: DISCONTINUED | OUTPATIENT
Start: 2020-05-31 | End: 2020-06-04

## 2020-05-31 RX ORDER — CEFTRIAXONE 500 MG/1
1000 INJECTION, POWDER, FOR SOLUTION INTRAMUSCULAR; INTRAVENOUS EVERY 24 HOURS
Refills: 0 | Status: DISCONTINUED | OUTPATIENT
Start: 2020-05-31 | End: 2020-05-31

## 2020-05-31 RX ORDER — DEXTROSE 50 % IN WATER 50 %
25 SYRINGE (ML) INTRAVENOUS ONCE
Refills: 0 | Status: DISCONTINUED | OUTPATIENT
Start: 2020-05-31 | End: 2020-06-04

## 2020-05-31 RX ORDER — INSULIN LISPRO 100/ML
VIAL (ML) SUBCUTANEOUS AT BEDTIME
Refills: 0 | Status: DISCONTINUED | OUTPATIENT
Start: 2020-05-31 | End: 2020-06-04

## 2020-05-31 RX ORDER — ONDANSETRON 8 MG/1
4 TABLET, FILM COATED ORAL ONCE
Refills: 0 | Status: COMPLETED | OUTPATIENT
Start: 2020-05-31 | End: 2020-05-31

## 2020-05-31 RX ORDER — SODIUM CHLORIDE 9 MG/ML
1000 INJECTION INTRAMUSCULAR; INTRAVENOUS; SUBCUTANEOUS ONCE
Refills: 0 | Status: COMPLETED | OUTPATIENT
Start: 2020-05-31 | End: 2020-05-31

## 2020-05-31 RX ORDER — TRAZODONE HCL 50 MG
50 TABLET ORAL AT BEDTIME
Refills: 0 | Status: DISCONTINUED | OUTPATIENT
Start: 2020-05-31 | End: 2020-06-04

## 2020-05-31 RX ORDER — NIFEDIPINE 30 MG
60 TABLET, EXTENDED RELEASE 24 HR ORAL DAILY
Refills: 0 | Status: DISCONTINUED | OUTPATIENT
Start: 2020-05-31 | End: 2020-06-04

## 2020-05-31 RX ORDER — ENOXAPARIN SODIUM 100 MG/ML
40 INJECTION SUBCUTANEOUS DAILY
Refills: 0 | Status: DISCONTINUED | OUTPATIENT
Start: 2020-05-31 | End: 2020-06-04

## 2020-05-31 RX ORDER — CEFTRIAXONE 500 MG/1
1000 INJECTION, POWDER, FOR SOLUTION INTRAMUSCULAR; INTRAVENOUS ONCE
Refills: 0 | Status: COMPLETED | OUTPATIENT
Start: 2020-05-31 | End: 2020-05-31

## 2020-05-31 RX ORDER — MORPHINE SULFATE 50 MG/1
2 CAPSULE, EXTENDED RELEASE ORAL ONCE
Refills: 0 | Status: DISCONTINUED | OUTPATIENT
Start: 2020-05-31 | End: 2020-05-31

## 2020-05-31 RX ORDER — SODIUM CHLORIDE 9 MG/ML
1000 INJECTION INTRAMUSCULAR; INTRAVENOUS; SUBCUTANEOUS
Refills: 0 | Status: DISCONTINUED | OUTPATIENT
Start: 2020-05-31 | End: 2020-06-03

## 2020-05-31 RX ORDER — LISINOPRIL 2.5 MG/1
40 TABLET ORAL DAILY
Refills: 0 | Status: DISCONTINUED | OUTPATIENT
Start: 2020-05-31 | End: 2020-05-31

## 2020-05-31 RX ORDER — PIPERACILLIN AND TAZOBACTAM 4; .5 G/20ML; G/20ML
3.38 INJECTION, POWDER, LYOPHILIZED, FOR SOLUTION INTRAVENOUS ONCE
Refills: 0 | Status: COMPLETED | OUTPATIENT
Start: 2020-05-31 | End: 2020-05-31

## 2020-05-31 RX ORDER — METOPROLOL TARTRATE 50 MG
25 TABLET ORAL DAILY
Refills: 0 | Status: DISCONTINUED | OUTPATIENT
Start: 2020-06-01 | End: 2020-06-04

## 2020-05-31 RX ORDER — GLUCAGON INJECTION, SOLUTION 0.5 MG/.1ML
1 INJECTION, SOLUTION SUBCUTANEOUS ONCE
Refills: 0 | Status: DISCONTINUED | OUTPATIENT
Start: 2020-05-31 | End: 2020-06-04

## 2020-05-31 RX ORDER — SODIUM CHLORIDE 9 MG/ML
1000 INJECTION INTRAMUSCULAR; INTRAVENOUS; SUBCUTANEOUS
Refills: 0 | Status: DISCONTINUED | OUTPATIENT
Start: 2020-05-31 | End: 2020-05-31

## 2020-05-31 RX ORDER — INSULIN LISPRO 100/ML
VIAL (ML) SUBCUTANEOUS
Refills: 0 | Status: DISCONTINUED | OUTPATIENT
Start: 2020-05-31 | End: 2020-06-04

## 2020-05-31 RX ADMIN — Medication 2: at 08:18

## 2020-05-31 RX ADMIN — ENOXAPARIN SODIUM 40 MILLIGRAM(S): 100 INJECTION SUBCUTANEOUS at 11:25

## 2020-05-31 RX ADMIN — SODIUM CHLORIDE 1000 MILLILITER(S): 9 INJECTION INTRAMUSCULAR; INTRAVENOUS; SUBCUTANEOUS at 06:25

## 2020-05-31 RX ADMIN — CEFTRIAXONE 100 MILLIGRAM(S): 500 INJECTION, POWDER, FOR SOLUTION INTRAMUSCULAR; INTRAVENOUS at 05:29

## 2020-05-31 RX ADMIN — Medication 50 MILLIGRAM(S): at 06:57

## 2020-05-31 RX ADMIN — ONDANSETRON 4 MILLIGRAM(S): 8 TABLET, FILM COATED ORAL at 01:19

## 2020-05-31 RX ADMIN — LISINOPRIL 40 MILLIGRAM(S): 2.5 TABLET ORAL at 08:18

## 2020-05-31 RX ADMIN — Medication 325 MILLIGRAM(S): at 11:25

## 2020-05-31 RX ADMIN — PIPERACILLIN AND TAZOBACTAM 200 GRAM(S): 4; .5 INJECTION, POWDER, LYOPHILIZED, FOR SOLUTION INTRAVENOUS at 08:55

## 2020-05-31 RX ADMIN — Medication 60 MILLIGRAM(S): at 08:19

## 2020-05-31 RX ADMIN — SODIUM CHLORIDE 250 MILLILITER(S): 9 INJECTION INTRAMUSCULAR; INTRAVENOUS; SUBCUTANEOUS at 03:45

## 2020-05-31 RX ADMIN — MORPHINE SULFATE 2 MILLIGRAM(S): 50 CAPSULE, EXTENDED RELEASE ORAL at 01:19

## 2020-05-31 RX ADMIN — Medication 50 MILLIGRAM(S): at 21:29

## 2020-05-31 RX ADMIN — SODIUM CHLORIDE 125 MILLILITER(S): 9 INJECTION INTRAMUSCULAR; INTRAVENOUS; SUBCUTANEOUS at 01:19

## 2020-05-31 RX ADMIN — ESCITALOPRAM OXALATE 20 MILLIGRAM(S): 10 TABLET, FILM COATED ORAL at 11:25

## 2020-05-31 RX ADMIN — ATORVASTATIN CALCIUM 10 MILLIGRAM(S): 80 TABLET, FILM COATED ORAL at 21:29

## 2020-05-31 RX ADMIN — Medication 2: at 17:17

## 2020-05-31 RX ADMIN — SODIUM CHLORIDE 50 MILLILITER(S): 9 INJECTION INTRAMUSCULAR; INTRAVENOUS; SUBCUTANEOUS at 08:42

## 2020-05-31 RX ADMIN — SODIUM CHLORIDE 1000 MILLILITER(S): 9 INJECTION INTRAMUSCULAR; INTRAVENOUS; SUBCUTANEOUS at 03:06

## 2020-05-31 RX ADMIN — PIPERACILLIN AND TAZOBACTAM 25 GRAM(S): 4; .5 INJECTION, POWDER, LYOPHILIZED, FOR SOLUTION INTRAVENOUS at 17:15

## 2020-05-31 NOTE — H&P ADULT - HISTORY OF PRESENT ILLNESS
85F hx demenita, HTN, HLD, DM2 presents with abdominal pain and not feeling well. Given dementia patient is very poor historian. She continues to states that wasn't feeling well today. According to Triage note when patient first arrived was complaining of abdominal apin and vomiting. She is unable to describe quality of pain, location, duration or relieving/worsening factors. Pt denies chest pain or sob. Unable to give further history.    In ED, pt was aferile, p- 83, bp 199/108, RR 16 and spo2 100. Pt ekg found to have new twi in v1-5. UA positive. CT a/p negative for acute pathology.

## 2020-05-31 NOTE — ED ADULT NURSE NOTE - NSIMPLEMENTINTERV_GEN_ALL_ED
Implemented All Fall with Harm Risk Interventions:  [place] to call system. Call bell, personal items and telephone within reach. Instruct patient to call for assistance. Room bathroom lighting operational. Non-slip footwear when patient is off stretcher. Physically safe environment: no spills, clutter or unnecessary equipment. Stretcher in lowest position, wheels locked, appropriate side rails in place. Provide visual cue, wrist band, yellow gown, etc. Monitor gait and stability. Monitor for mental status changes and reorient to person, place, and time. Review medications for side effects contributing to fall risk. Reinforce activity limits and safety measures with patient and family. Provide visual clues: red socks.

## 2020-05-31 NOTE — ED PROVIDER NOTE - OBJECTIVE STATEMENT
86 y/o F with hx of dementia A&O x 2 BIBA c/o "she just doesn't feel good".  Patient unable to provide any other history.  Patient appears to be unkempt, with vomit on her shirt.  Patient grimacing and pointing to her stomach.

## 2020-05-31 NOTE — CONSULT NOTE ADULT - ASSESSMENT
86yo female with Suprapubic abdominal pain, leukocytosis, +UA, hypovolemic hyponatremia, MEREDITH likely secondary to UTI.  Unlikely cholecystitis given normal ultrasound and exam, however agree with HIDA scan to definitively evaluate gallbladder filling and ejection.    Will follow for results    Further recommendations pending HIDA    Seen and discussed with Attending Dr. Danny Burrows.

## 2020-05-31 NOTE — CONSULT NOTE ADULT - ASSESSMENT
A/P: 86 y/o F with a PMHx of dementia, HTN, HLD, and DMII who presented to the ED with complaints of abdominal pain, nausea, and vomiting. Patient is a very poor historian, and has a hard time answering direct questions. Patient states she came in because "she was invited." Patient does note some abdominal pain still. Patient was found to have an EKG with N t wave inversion anteroseptally, but denies any chest pain or shortness of breath. Patient also found to have a UTI and possible cholecystitis. Patient unable to provide further ROS.   Troponin negative x 2    1. Abnormal EKG  - EKG with new t wave inversions   - Denies any chest pain or dyspnea.   - Troponins negative x 2  - Patient also with 1st degree AV block, short runs of Wenckebach on tele, and Bifasicular block  - Obtain TTE to further evaluate.   - Further workup pending echo results.   - Continue telemetry monitoring.     2. HTN  - Continue home meds.     3. HLD  - Continue statin.     Preliminary evaluation, please await official recommendations by Dr. Casillas A/P: 86 y/o F with a PMHx of dementia, HTN, HLD, and DMII who presented to the ED with complaints of abdominal pain, nausea, and vomiting. Patient is a very poor historian, and has a hard time answering direct questions. Patient states she came in because "she was invited." Patient does note some abdominal pain still. Patient was found to have an EKG with N t wave inversion anteroseptally, but denies any chest pain or shortness of breath. Patient also found to have a UTI and possible cholecystitis. Patient unable to provide further ROS.   Troponin negative x 2    1. Abnormal EKG  - EKG with new t wave inversions   - Denies any chest pain or dyspnea.   - Troponins negative x 2  - Patient also with 1st degree AV block, short runs of Wenckebach on tele, and Bifasicular block  - Obtain TTE to further evaluate.   - Continue telemetry monitoring.   - NPO after midnight  - Nuclear stress test tomorrow.     2. HTN  - Continue home meds.     3. HLD  - Continue statin.     4. Second Degree Heart Block   - Decrease Toprol XL to 25mg PO Qday    Preliminary evaluation, please await official recommendations by Dr. Casillas A/P: 86 y/o F with a PMHx of dementia, HTN, HLD, and DMII who presented to the ED with complaints of abdominal pain, nausea, and vomiting. Patient is a very poor historian, and has a hard time answering direct questions. Patient states she came in because "she was invited." Patient does note some abdominal pain still. Patient was found to have an EKG with N t wave inversion anteroseptally, but denies any chest pain or shortness of breath. Patient also found to have a UTI and possible cholecystitis. Patient unable to provide further ROS.   Troponin negative x 2    1. Abnormal EKG  - EKG with new t wave inversions   - Denies any chest pain or dyspnea.   - Troponins negative x 2  - Patient also with 1st degree AV block, short runs of Wenckebach on tele, and Bifasicular block  - Obtain TTE to further evaluate.   - Continue telemetry monitoring.   - NPO after midnight  - Nuclear stress test tomorrow.     2. HTN  - Continue home meds.     3. HLD  - Continue statin.     4. Second Degree Heart Block   - Decrease Toprol XL to 25mg PO Qday

## 2020-05-31 NOTE — H&P ADULT - NSHPLABSRESULTS_GEN_ALL_CORE
12.1   16.96 )-----------( 446      ( 31 May 2020 00:53 )             34.8           129<L>  |  89<L>  |  29.0<H>  ----------------------------<  230<H>  4.3   |  19.0<L>  |  1.41<H>    Ca    9.2      31 May 2020 00:53    TPro  7.6  /  Alb  4.7  /  TBili  0.4  /  DBili  x   /  AST  17  /  ALT  15  /  AlkPhos  89                Urinalysis Basic - ( 31 May 2020 04:27 )    Color: Yellow / Appearance: Clear / S.005 / pH: x  Gluc: x / Ketone: Negative  / Bili: Negative / Urobili: Negative   Blood: x / Protein: 100 / Nitrite: Positive   Leuk Esterase: Small / RBC: 3-5 /HPF / WBC 6-10   Sq Epi: x / Non Sq Epi: Occasional / Bacteria: Moderate      Lactate Trend      CARDIAC MARKERS ( 31 May 2020 03:48 )  x     / <0.01 ng/mL / x     / x     / x      CARDIAC MARKERS ( 31 May 2020 00:53 )  x     / <0.01 ng/mL / 30 U/L / x     / x          CAPILLARY BLOOD GLUCOSE      POCT Blood Glucose.: 203 mg/dL (31 May 2020 00:04)      RADIOLOGY, EKG & ADDITIONAL TESTS: Reviewed.     EKG- NSR, bifascicular block, TWI in leads v1-5    cxr- lung grossly clear, pending final read    < from: CT Abdomen and Pelvis w/ Oral Cont (20 @ 03:08) >    IMPRESSION:   Prominent gallbladder with cholelithiasis, if there is concern for acute cholecystitis, right upper quadrant ultrasound May BE helpful.    < end of copied text >    < from: US Gallbladder (20 @ 04:49) >    FINDINGS:    The gallbladder is moderately distended with gallstones. There is normal thickening or pericholecystic fluid. There is no sonographic Soto sign. The common bile duct is not dilated.      There is no free abdominal fluid.    Impression:    Gallstones.    < end of copied text >    < from: CT Head No Cont (20 @ 03:08) >    IMPRESSION:    No intracranial hemorrhage, mass effect or large acute cortical infarct.    < end of copied text >

## 2020-05-31 NOTE — PROGRESS NOTE ADULT - ASSESSMENT
#abnormal EKG   cardio consult   new T wave inversion   continue with medical management   patient is asymptomatic     #. Possible UTI   will follow urine culture   no pyelonephritis   switch h to zosyn     # possible cholecystitis   imaging shows distended gall bladder , patient came in with abdominal pain   leukocytosis   will do HIDA , surgery consult requested   switch antibiotics  to zosyn   send blood cultures     # MEREDITH   will do gentle hydration   DC lisinopril     # HTN   on home regimen , hold off on ACE     # DLP  on statin     # Depression / dementia   on home regimen     # DVT prophylaxis  lovenox     family called and updated

## 2020-05-31 NOTE — H&P ADULT - ASSESSMENT
85F hx demenita, HTN, HLD, DM2 presents with abdominal pain and not feeling well, found to have twi on EKG, UTI.

## 2020-05-31 NOTE — ED PROVIDER NOTE - CARE PLAN
Principal Discharge DX:	T wave inversion in EKG  Secondary Diagnosis:	Urinary tract infection without hematuria, site unspecified

## 2020-05-31 NOTE — ED PROVIDER NOTE - ATTENDING CONTRIBUTION TO CARE
Pt with non-specific complaints, hemodynamically stable, afebrile. Noted leukocytosis, CT of the abdomen remarkable for cholelithiasis, no evidence of cholecystitis on US. UA consistent with cystitis, will treat with ceftriaxone. Found new anterior TWI on ECG, pt denies any chest pain, first troponin negative, will continue serial enzymes and admit on telemetry for ACS rule out.

## 2020-05-31 NOTE — ED ADULT TRIAGE NOTE - CHIEF COMPLAINT QUOTE
Pt A&Ox4 states "I have had stomach pain and vomiting for the past hour." BIBA c/o generalized abd pain and vomiting. Patient is adiabetic , has nausea and vomiting.

## 2020-05-31 NOTE — CONSULT NOTE ADULT - SUBJECTIVE AND OBJECTIVE BOX
[place] [facility]                                                               Office:  [address] [address]                                                              Telephone: [phone]. Fax:[phone]                                                                        CARDIOLOGY CONSULTATION NOTE                                                                                             Consult requested by:  Dr. Roberto  Reason for Consultation: Abnormal EKG  History obtained by: Patient and medical record   obtained: No    Chief complaint:    Patient is a 85y old  Female who presents with a chief complaint of abnormal ekg, uti (31 May 2020 08:45)      HPI: 84 y/o F with a PMHx of dementia, HTN, HLD, and DMII who presented to the ED with complaints of abdominal pain, nausea, and vomiting. Patient is a very poor historian, and has a hard time answering direct questions. Patient states she came in because "she was invited." Patient does note some abdominal pain still. Patient was found to have an EKG with N t wave inversion anteroseptally, but denies any chest pain or shortness of breath. Patient also found to have a UTI and possible cholecystitis. Patient unable to provide further ROS.     REVIEW OF SYMPTOMS: AS PER HPI, Due to altered mental status, subjective information was not able to be obtained from the patient. History was obtained, to the extent possible, from review of the chart and collateral sources of information.      PREVIOUS DIAGNOSTIC TESTING  ECHO FINDINGS: Pendin    ALLERGIES: Allergies    No Known Allergies    Intolerances      PAST MEDICAL HISTORY  Dementia  Depression  HLD (hyperlipidemia)  HTN (hypertension)  Diabetes mellitus      PAST SURGICAL HISTORY  No significant past surgical history      FAMILY HISTORY:  Unable to obtain      SOCIAL HISTORY:  Unable to obtain due to patient's mental status.       CURRENT MEDICATIONS:  metoprolol succinate ER 50 milliGRAM(s) Oral daily  NIFEdipine XL 60 milliGRAM(s) Oral daily     aspirin  escitalopram  atorvastatin  dextrose 5%.  dextrose 50% Injectable  dextrose 50% Injectable  dextrose 50% Injectable  enoxaparin Injectable  insulin lispro (HumaLOG) corrective regimen sliding scale  insulin lispro (HumaLOG) corrective regimen sliding scale  piperacillin/tazobactam IVPB..  sodium chloride 0.9%.    Home Medications:  atorvastatin 10 mg oral tablet: 1 tab(s) orally once a day (2019 12:03)  escitalopram 20 mg oral tablet: 1 tab(s) orally once a day (2019 12:03)  metFORMIN:  orally  (2019 12:03)  metFORMIN 1000 mg oral tablet: 1 tab(s) orally 2 times a day (2019 12:03)  metoprolol:  orally  (2019 12:03)  metoprolol succinate 50 mg oral tablet, extended release: 1 tab(s) orally once a day (2019 12:03)  NIFEdipine 60 mg oral tablet, extended release: 1 tab(s) orally once a day (2019 12:03)  ramipril 10 mg oral capsule: 1 cap(s) orally once a day (2019 12:03)  traZODone 100 mg oral tablet: 1 tab(s) orally 3 times a day (2019 12:03)        HOME MEDICATIONS:      Vital Signs Last 24 Hrs  T(C): 36.7 (31 May 2020 00:11), Max: 36.7 (31 May 2020 00:11)  T(F): 98.1 (31 May 2020 00:11), Max: 98.1 (31 May 2020 00:11)  HR: 87 (31 May 2020 06:06) (83 - 87)  BP: 166/77 (31 May 2020 06:06) (166/77 - 199/108)  BP(mean): --  RR: 18 (31 May 2020 06:06) (16 - 18)  SpO2: 99% (31 May 2020 06:06) (99% - 100%)      PHYSICAL EXAM:  Constitutional: Comfortable . No acute distress.   HEENT: Atraumatic and normocephalic , neck is supple . no JVD. No carotid bruit. PEERL   CNS: A&Ox 1-2, very poor historian. Moves all extremities   Lymph Nodes: Cervical : Not palpable.  Respiratory: CTAB  Cardiovascular: S1S2 RRR. No rubs or gallop. II/VI systolic murmur lsb  Gastrointestinal: Soft non-tender and non distended . +Bowel sounds. negative Soto's sign.  Extremities: No edema.   Psychiatric: Calm . no agitation.  Skin: No skin rash/ulcers visualized to face, hands or feet.      Intake and output:     LABS:                        12.1   16.96 )-----------( 446      ( 31 May 2020 00:53 )             34.8     05-31    129<L>  |  89<L>  |  29.0<H>  ----------------------------<  230<H>  4.3   |  19.0<L>  |  1.41<H>    Ca    9.2      31 May 2020 00:53    TPro  7.6  /  Alb  4.7  /  TBili  0.4  /  DBili  x   /  AST  17  /  ALT  15  /  AlkPhos  89      CARDIAC MARKERS ( 31 May 2020 03:48 )  x     / <0.01 ng/mL / x     / x     / x      CARDIAC MARKERS ( 31 May 2020 00:53 )  x     / <0.01 ng/mL / 30 U/L / x     / x        ;p-BNP=    Urinalysis Basic - ( 31 May 2020 04:27 )    Color: Yellow / Appearance: Clear / S.005 / pH: x  Gluc: x / Ketone: Negative  / Bili: Negative / Urobili: Negative   Blood: x / Protein: 100 / Nitrite: Positive   Leuk Esterase: Small / RBC: 3-5 /HPF / WBC 6-10   Sq Epi: x / Non Sq Epi: Occasional / Bacteria: Moderate        INTERPRETATION OF TELEMETRY: Reviewed by me. SR 1st degree HB, Second degree HB type I self-limited   ECG: Reviewed by me. SR 1st degree heart block, Bifasicular block, T wave inversions anteroseptal leads     RADIOLOGY & ADDITIONAL STUDIES:    X-ray:  reviewed by me.   < from: Xray Chest 1 View AP/PA. (01.10.17 @ 11:57) >   EXAM:  CHEST SINGLE VIEW FRONTAL                          PROCEDURE DATE:  01/10/2017        INTERPRETATION:  Portable chest radiograph        CLINICAL INFORMATION: Chest pain    TECHNIQUE:  Portable  AP view of the chest was obtained.    COMPARISON: 2012 available for review.    FINDINGS:    The lungs  are clear.  No pleural abnormality is seen.         The heart and mediastinum are within normal limits.         Visualized osseous structures are intact.        IMPRESSION:   No evidence ofactive chest disease.          < end of copied text >    CT scan:   < from: CT Abdomen and Pelvis w/ Oral Cont (20 @ 03:08) >   EXAM:  CT ABDOMEN AND PELVIS OC                          PROCEDURE DATE:  2020          INTERPRETATION:  CLINICAL INFORMATION:  abd pain    COMPARISON: 2012    PROCEDURE:   CT of the Abdomen and Pelvis was performed without intravenous contrast.   Intravenous contrast: None.  Oral contrast: None.  Sagittal and coronal reformats were performed.    FINDINGS:  LOWER CHEST: Within normal limits.    LIVER: Within normal limits.  BILE DUCTS: Normal caliber.  GALLBLADDER: Cholelithiasis. Distended gallbladder.  SPLEEN: Within normal limits.  PANCREAS: Within normal limits.  ADRENALS: Within normal limits.  KIDNEYS/URETERS: Lobulated kidneys. Probable cyst arising from the right renal cortex. No hydronephrosis or nephrolithiasis.    BLADDER: Within normal limits.  REPRODUCTIVE ORGANS: Hysterectomy.    BOWEL: No bowel obstruction. Appendix is not visualized. No evidence of inflammation in the pericecal region.  PERITONEUM: No ascites.  VESSELS: Atherosclerotic changes.  RETROPERITONEUM/LYMPH NODES: No lymphadenopathy.    ABDOMINAL WALL: Within normal limits.  BONES: Degenerative changes. There is some ossification of the left abductor musculature.    IMPRESSION:   Prominent gallbladder with cholelithiasis, if there is concern for acute cholecystitis, right upper quadrant ultrasound May BE helpful.    < end of copied text > [place] [facility]                                                               Office:  [address] [address]                                                              Telephone: [phone]. Fax:[phone]                                                                        CARDIOLOGY CONSULTATION NOTE                                                                                             Consult requested by:  Dr. Roberto  Reason for Consultation: Abnormal EKG  History obtained by: Patient and medical record   obtained: No    Chief complaint:    Patient is a 85y old  Female who presents with a chief complaint of abnormal ekg, uti (31 May 2020 08:45)      HPI: 86 y/o F with a PMHx of dementia, HTN, HLD, and DMII who presented to the ED with complaints of abdominal pain, nausea, and vomiting. Patient is a very poor historian, and has a hard time answering direct questions. Patient states she came in because "she was invited." Patient does note some abdominal pain still. Patient was found to have an EKG with new T wave inversion anteroseptally, but denies any chest pain or shortness of breath. Patient also found to have a UTI and possible cholecystitis. Patient unable to provide further ROS.     REVIEW OF SYMPTOMS: AS PER HPI, Due to altered mental status, subjective information was not able to be obtained from the patient. History was obtained, to the extent possible, from review of the chart and collateral sources of information.      PREVIOUS DIAGNOSTIC TESTING  ECHO FINDINGS: Pendin    ALLERGIES: Allergies    No Known Allergies    Intolerances      PAST MEDICAL HISTORY  Dementia  Depression  HLD (hyperlipidemia)  HTN (hypertension)  Diabetes mellitus      PAST SURGICAL HISTORY  No significant past surgical history      FAMILY HISTORY:  Unable to obtain      SOCIAL HISTORY:  Unable to obtain due to patient's mental status.       CURRENT MEDICATIONS:  metoprolol succinate ER 50 milliGRAM(s) Oral daily  NIFEdipine XL 60 milliGRAM(s) Oral daily     aspirin  escitalopram  atorvastatin  dextrose 5%.  dextrose 50% Injectable  dextrose 50% Injectable  dextrose 50% Injectable  enoxaparin Injectable  insulin lispro (HumaLOG) corrective regimen sliding scale  insulin lispro (HumaLOG) corrective regimen sliding scale  piperacillin/tazobactam IVPB..  sodium chloride 0.9%.    Home Medications:  atorvastatin 10 mg oral tablet: 1 tab(s) orally once a day (2019 12:03)  escitalopram 20 mg oral tablet: 1 tab(s) orally once a day (2019 12:03)  metFORMIN:  orally  (2019 12:03)  metFORMIN 1000 mg oral tablet: 1 tab(s) orally 2 times a day (2019 12:03)  metoprolol:  orally  (2019 12:03)  metoprolol succinate 50 mg oral tablet, extended release: 1 tab(s) orally once a day (2019 12:03)  NIFEdipine 60 mg oral tablet, extended release: 1 tab(s) orally once a day (2019 12:03)  ramipril 10 mg oral capsule: 1 cap(s) orally once a day (2019 12:03)  traZODone 100 mg oral tablet: 1 tab(s) orally 3 times a day (2019 12:03)        HOME MEDICATIONS:      Vital Signs Last 24 Hrs  T(C): 36.7 (31 May 2020 00:11), Max: 36.7 (31 May 2020 00:11)  T(F): 98.1 (31 May 2020 00:11), Max: 98.1 (31 May 2020 00:11)  HR: 87 (31 May 2020 06:06) (83 - 87)  BP: 166/77 (31 May 2020 06:06) (166/77 - 199/108)  BP(mean): --  RR: 18 (31 May 2020 06:06) (16 - 18)  SpO2: 99% (31 May 2020 06:06) (99% - 100%)      PHYSICAL EXAM:  Constitutional: Comfortable . No acute distress.   HEENT: Atraumatic and normocephalic , neck is supple . no JVD. No carotid bruit. PEERL   CNS: A&Ox 1-2, very poor historian. Moves all extremities   Lymph Nodes: Cervical : Not palpable.  Respiratory: CTAB  Cardiovascular: S1S2 RRR. No rubs or gallop. II/VI systolic murmur lsb  Gastrointestinal: Soft non-tender and non distended . +Bowel sounds. negative Soto's sign.  Extremities: No edema.   Psychiatric: Calm . no agitation.  Skin: No skin rash/ulcers visualized to face, hands or feet.      Intake and output:     LABS:                        12.1   16.96 )-----------( 446      ( 31 May 2020 00:53 )             34.8     05-31    129<L>  |  89<L>  |  29.0<H>  ----------------------------<  230<H>  4.3   |  19.0<L>  |  1.41<H>    Ca    9.2      31 May 2020 00:53    TPro  7.6  /  Alb  4.7  /  TBili  0.4  /  DBili  x   /  AST  17  /  ALT  15  /  AlkPhos  89      CARDIAC MARKERS ( 31 May 2020 03:48 )  x     / <0.01 ng/mL / x     / x     / x      CARDIAC MARKERS ( 31 May 2020 00:53 )  x     / <0.01 ng/mL / 30 U/L / x     / x        ;p-BNP=    Urinalysis Basic - ( 31 May 2020 04:27 )    Color: Yellow / Appearance: Clear / S.005 / pH: x  Gluc: x / Ketone: Negative  / Bili: Negative / Urobili: Negative   Blood: x / Protein: 100 / Nitrite: Positive   Leuk Esterase: Small / RBC: 3-5 /HPF / WBC 6-10   Sq Epi: x / Non Sq Epi: Occasional / Bacteria: Moderate        INTERPRETATION OF TELEMETRY: Reviewed by me. SR 1st degree HB, Second degree HB type I self-limited   ECG: Reviewed by me. SR 1st degree heart block, Bifasicular block, T wave inversions anteroseptal leads     RADIOLOGY & ADDITIONAL STUDIES:    X-ray:  reviewed by me.   < from: Xray Chest 1 View AP/PA. (01.10.17 @ 11:57) >   EXAM:  CHEST SINGLE VIEW FRONTAL                          PROCEDURE DATE:  01/10/2017        INTERPRETATION:  Portable chest radiograph        CLINICAL INFORMATION: Chest pain    TECHNIQUE:  Portable  AP view of the chest was obtained.    COMPARISON: 2012 available for review.    FINDINGS:    The lungs  are clear.  No pleural abnormality is seen.         The heart and mediastinum are within normal limits.         Visualized osseous structures are intact.        IMPRESSION:   No evidence ofactive chest disease.          < end of copied text >    CT scan:   < from: CT Abdomen and Pelvis w/ Oral Cont (20 @ 03:08) >   EXAM:  CT ABDOMEN AND PELVIS OC                          PROCEDURE DATE:  2020          INTERPRETATION:  CLINICAL INFORMATION:  abd pain    COMPARISON: 2012    PROCEDURE:   CT of the Abdomen and Pelvis was performed without intravenous contrast.   Intravenous contrast: None.  Oral contrast: None.  Sagittal and coronal reformats were performed.    FINDINGS:  LOWER CHEST: Within normal limits.    LIVER: Within normal limits.  BILE DUCTS: Normal caliber.  GALLBLADDER: Cholelithiasis. Distended gallbladder.  SPLEEN: Within normal limits.  PANCREAS: Within normal limits.  ADRENALS: Within normal limits.  KIDNEYS/URETERS: Lobulated kidneys. Probable cyst arising from the right renal cortex. No hydronephrosis or nephrolithiasis.    BLADDER: Within normal limits.  REPRODUCTIVE ORGANS: Hysterectomy.    BOWEL: No bowel obstruction. Appendix is not visualized. No evidence of inflammation in the pericecal region.  PERITONEUM: No ascites.  VESSELS: Atherosclerotic changes.  RETROPERITONEUM/LYMPH NODES: No lymphadenopathy.    ABDOMINAL WALL: Within normal limits.  BONES: Degenerative changes. There is some ossification of the left abductor musculature.    IMPRESSION:   Prominent gallbladder with cholelithiasis, if there is concern for acute cholecystitis, right upper quadrant ultrasound May BE helpful.    < end of copied text >

## 2020-05-31 NOTE — ED ADULT NURSE REASSESSMENT NOTE - NS ED NURSE REASSESS COMMENT FT1
pt given a bed bath and placed in clean diaper and linen. pt arrived to ER with 3 diapers layered on top of each other saturated with urine. pt skin in in tact.

## 2020-05-31 NOTE — CONSULT NOTE ADULT - ATTENDING COMMENTS
Patient seen and examined with surgery team. Above note reviewed and edited where appropriate.     Patient complains of lower abdominal pain.   Abdomen soft mild lower abdominal tenderness. No RUQ or epigastric tenderness. Negative Soto's sign.   Ultrasound reviewed   HIDA pending  Low suspicion for acute cholecystitis  Will follow
Patient seen  I have discussed my recommendation with the PA which are outlined above.  Patient did not allow me to examine her.  Will follow.

## 2020-05-31 NOTE — PROGRESS NOTE ADULT - SUBJECTIVE AND OBJECTIVE BOX
CC: UTI , abdominal pain     INTERVAL HPI/OVERNIGHT EVENTS: seen and examined , still has nausea and abdominal pain           Vital Signs Last 24 Hrs  T(C): 36.7 (31 May 2020 00:11), Max: 36.7 (31 May 2020 00:11)  T(F): 98.1 (31 May 2020 00:11), Max: 98.1 (31 May 2020 00:11)  HR: 87 (31 May 2020 06:06) (83 - 87)  BP: 166/77 (31 May 2020 06:06) (166/77 - 199/108)  BP(mean): --  RR: 18 (31 May 2020 06:06) (16 - 18)  SpO2: 99% (31 May 2020 06:06) (99% - 100%)    PHYSICAL EXAM:    GENERAL: NAD, resting comfortable in bed   CHEST/LUNG: CTAB , no wheezing , rales, rhonchi heard   HEART: S1S2+, Regular rate and rhythm  ABDOMEN: Soft, Nontender, Nondistended; Bowel sounds present  EXTREMITIES:  no pitting edema , pulses palpated BL.        LABS:                        12.1   16.96 )-----------( 446      ( 31 May 2020 00:53 )             34.8     05-31    129<L>  |  89<L>  |  29.0<H>  ----------------------------<  230<H>  4.3   |  19.0<L>  |  1.41<H>    Ca    9.2      31 May 2020 00:53    TPro  7.6  /  Alb  4.7  /  TBili  0.4  /  DBili  x   /  AST  17  /  ALT  15  /  AlkPhos  89  05-31      Urinalysis Basic - ( 31 May 2020 04:27 )    Color: Yellow / Appearance: Clear / S.005 / pH: x  Gluc: x / Ketone: Negative  / Bili: Negative / Urobili: Negative   Blood: x / Protein: 100 / Nitrite: Positive   Leuk Esterase: Small / RBC: 3-5 /HPF / WBC 6-10   Sq Epi: x / Non Sq Epi: Occasional / Bacteria: Moderate          MEDICATIONS  (STANDING):  aspirin 325 milliGRAM(s) Oral once  atorvastatin 10 milliGRAM(s) Oral at bedtime  dextrose 5%. 1000 milliLiter(s) (50 mL/Hr) IV Continuous <Continuous>  dextrose 50% Injectable 12.5 Gram(s) IV Push once  dextrose 50% Injectable 25 Gram(s) IV Push once  dextrose 50% Injectable 25 Gram(s) IV Push once  enoxaparin Injectable 40 milliGRAM(s) SubCutaneous daily  escitalopram 20 milliGRAM(s) Oral daily  insulin lispro (HumaLOG) corrective regimen sliding scale   SubCutaneous three times a day before meals  insulin lispro (HumaLOG) corrective regimen sliding scale   SubCutaneous at bedtime  metoprolol succinate ER 50 milliGRAM(s) Oral daily  NIFEdipine XL 60 milliGRAM(s) Oral daily  piperacillin/tazobactam IVPB. 3.375 Gram(s) IV Intermittent once  piperacillin/tazobactam IVPB.. 3.375 Gram(s) IV Intermittent every 8 hours  sodium chloride 0.9%. 1000 milliLiter(s) (50 mL/Hr) IV Continuous <Continuous>    MEDICATIONS  (PRN):  dextrose 40% Gel 15 Gram(s) Oral once PRN Blood Glucose LESS THAN 70 milliGRAM(s)/deciliter  glucagon  Injectable 1 milliGRAM(s) IntraMuscular once PRN Glucose LESS THAN 70 milligrams/deciliter  traZODone 50 milliGRAM(s) Oral at bedtime PRN insomnia      RADIOLOGY & ADDITIONAL TESTS: CC: UTI , abdominal pain     INTERVAL HPI/OVERNIGHT EVENTS: seen and examined , denies any complains   confused , but pleasant , stated that she wants to rest            Vital Signs Last 24 Hrs  T(C): 36.7 (31 May 2020 00:11), Max: 36.7 (31 May 2020 00:11)  T(F): 98.1 (31 May 2020 00:11), Max: 98.1 (31 May 2020 00:11)  HR: 87 (31 May 2020 06:06) (83 - 87)  BP: 166/77 (31 May 2020 06:06) (166/77 - 199/108)  BP(mean): --  RR: 18 (31 May 2020 06:06) (16 - 18)  SpO2: 99% (31 May 2020 06:06) (99% - 100%)    PHYSICAL EXAM:    GENERAL: NAD, resting comfortable in bed   CHEST/LUNG: CTAB , no wheezing , rales, rhonchi heard   HEART: S1S2+, Regular rate and rhythm  ABDOMEN: Soft, Nontender, Nondistended; Bowel sounds present  EXTREMITIES:  no pitting edema , pulses palpated BL.        LABS:                        12.1   16.96 )-----------( 446      ( 31 May 2020 00:53 )             34.8     05-31    129<L>  |  89<L>  |  29.0<H>  ----------------------------<  230<H>  4.3   |  19.0<L>  |  1.41<H>    Ca    9.2      31 May 2020 00:53    TPro  7.6  /  Alb  4.7  /  TBili  0.4  /  DBili  x   /  AST  17  /  ALT  15  /  AlkPhos  89  05-      Urinalysis Basic - ( 31 May 2020 04:27 )    Color: Yellow / Appearance: Clear / S.005 / pH: x  Gluc: x / Ketone: Negative  / Bili: Negative / Urobili: Negative   Blood: x / Protein: 100 / Nitrite: Positive   Leuk Esterase: Small / RBC: 3-5 /HPF / WBC 6-10   Sq Epi: x / Non Sq Epi: Occasional / Bacteria: Moderate          MEDICATIONS  (STANDING):  aspirin 325 milliGRAM(s) Oral once  atorvastatin 10 milliGRAM(s) Oral at bedtime  dextrose 5%. 1000 milliLiter(s) (50 mL/Hr) IV Continuous <Continuous>  dextrose 50% Injectable 12.5 Gram(s) IV Push once  dextrose 50% Injectable 25 Gram(s) IV Push once  dextrose 50% Injectable 25 Gram(s) IV Push once  enoxaparin Injectable 40 milliGRAM(s) SubCutaneous daily  escitalopram 20 milliGRAM(s) Oral daily  insulin lispro (HumaLOG) corrective regimen sliding scale   SubCutaneous three times a day before meals  insulin lispro (HumaLOG) corrective regimen sliding scale   SubCutaneous at bedtime  metoprolol succinate ER 50 milliGRAM(s) Oral daily  NIFEdipine XL 60 milliGRAM(s) Oral daily  piperacillin/tazobactam IVPB. 3.375 Gram(s) IV Intermittent once  piperacillin/tazobactam IVPB.. 3.375 Gram(s) IV Intermittent every 8 hours  sodium chloride 0.9%. 1000 milliLiter(s) (50 mL/Hr) IV Continuous <Continuous>    MEDICATIONS  (PRN):  dextrose 40% Gel 15 Gram(s) Oral once PRN Blood Glucose LESS THAN 70 milliGRAM(s)/deciliter  glucagon  Injectable 1 milliGRAM(s) IntraMuscular once PRN Glucose LESS THAN 70 milligrams/deciliter  traZODone 50 milliGRAM(s) Oral at bedtime PRN insomnia      RADIOLOGY & ADDITIONAL TESTS:

## 2020-05-31 NOTE — H&P ADULT - NSICDXPASTMEDICALHX_GEN_ALL_CORE_FT
PAST MEDICAL HISTORY:  Dementia     Depression     Diabetes mellitus     HLD (hyperlipidemia)     HTN (hypertension)

## 2020-05-31 NOTE — H&P ADULT - NSHPPHYSICALEXAM_GEN_ALL_CORE
Vital Signs Last 24 Hrs  T(C): 36.7 (31 May 2020 00:11), Max: 36.7 (31 May 2020 00:11)  T(F): 98.1 (31 May 2020 00:11), Max: 98.1 (31 May 2020 00:11)  HR: 83 (31 May 2020 00:11) (83 - 83)  BP: 175/81 (31 May 2020 00:56) (175/81 - 199/108)  BP(mean): --  RR: 16 (31 May 2020 00:11) (16 - 16)  SpO2: 100% (31 May 2020 00:11) (100% - 100%)    GENERAL: NAD, awake and alert  HEENT:  Atraumatic, Normocephalic, MMM, no oropharyngeal lesions  EYES: EOMI, PERRLA, conjunctiva and sclera clear  NECK: Supple, No JVD, no throat tenderness.  CHEST/LUNG: Clear to auscultation bilaterally; No wheezes, rales, or rhonchi  HEART: Regular rate and rhythm; No murmurs, rubs, or gallops  ABDOMEN: Soft, Nontender, Nondistended; Bowel sounds present  EXTREMITIES:  2+ Peripheral Pulses, No clubbing, cyanosis, or edema  NEUROLOGY: AAo x1-2, very poor short term memory, very tangiential with questioning, but answers most questions appropriately if can remembr. moves all extremities spontaneously. no sensory deficits  SKIN: No rashes or lesions

## 2020-05-31 NOTE — H&P ADULT - PROBLEM SELECTOR PLAN 1
pt w/ new TWI on ekg, troponin negative x2. denying chest pain, but unreliable historian no known cardiac hx  cardiology consulted, will f/u recs  continue to trend troponin and ekg  will give aspirin 325 now  c/w baby aspirin  continue pt on metoprolol and atovastatin  admit to tele

## 2020-05-31 NOTE — CONSULT NOTE ADULT - SUBJECTIVE AND OBJECTIVE BOX
Surgery HPI:  86yo female admitted to medicine with UTI and hypovolemia.  Patient reports lower abdominal pain, specifically suprapubic.  Dull in nature and mild.  Denies right upper quadrant pain.  Denies f/c/n/v.  Denies chest pain, dyspnea.  Limited history due to dementia and hearing deficits.   Currently being treated for UTI and receiving IVF resuscitation.    medicine HPI:  85F hx demenita, HTN, HLD, DM2 presents with abdominal pain and not feeling well. Given dementia patient is very poor historian. She continues to states that wasn't feeling well today. According to Triage note when patient first arrived was complaining of abdominal apin and vomiting. She is unable to describe quality of pain, location, duration or relieving/worsening factors. Pt denies chest pain or sob. Unable to give further history.  In ED, pt was aferile, p- 83, bp 199/108, RR 16 and spo2 100. Pt ekg found to have new twi in v1-5. UA positive. CT a/p negative for acute pathology. (31 May 2020 05:52)    PAST MEDICAL & SURGICAL HISTORY:  Dementia  Depression  HLD (hyperlipidemia)  HTN (hypertension)  Diabetes mellitus  No significant past surgical history	    MEDICATIONS  (STANDING):  atorvastatin 10 milliGRAM(s) Oral at bedtime  dextrose 5%. 1000 milliLiter(s) (50 mL/Hr) IV Continuous <Continuous>  dextrose 50% Injectable 12.5 Gram(s) IV Push once  dextrose 50% Injectable 25 Gram(s) IV Push once  dextrose 50% Injectable 25 Gram(s) IV Push once  enoxaparin Injectable 40 milliGRAM(s) SubCutaneous daily  escitalopram 20 milliGRAM(s) Oral daily  insulin lispro (HumaLOG) corrective regimen sliding scale   SubCutaneous three times a day before meals  insulin lispro (HumaLOG) corrective regimen sliding scale   SubCutaneous at bedtime  NIFEdipine XL 60 milliGRAM(s) Oral daily  piperacillin/tazobactam IVPB.. 3.375 Gram(s) IV Intermittent every 8 hours  sodium chloride 0.9%. 1000 milliLiter(s) (50 mL/Hr) IV Continuous <Continuous>    MEDICATIONS  (PRN):  dextrose 40% Gel 15 Gram(s) Oral once PRN Blood Glucose LESS THAN 70 milliGRAM(s)/deciliter  glucagon  Injectable 1 milliGRAM(s) IntraMuscular once PRN Glucose LESS THAN 70 milligrams/deciliter  traZODone 50 milliGRAM(s) Oral at bedtime PRN insomnia    Allergies  No Known Allergies    FAMILY HISTORY:  No pertinent family history in first degree relatives      Vital Signs Last 24 Hrs  T(C): 36.7 (31 May 2020 00:11), Max: 36.7 (31 May 2020 00:11)  T(F): 98.1 (31 May 2020 00:11), Max: 98.1 (31 May 2020 00:11)  HR: 87 (31 May 2020 06:06) (83 - 87)  BP: 166/77 (31 May 2020 06:06) (166/77 - 199/108)  BP(mean): --  RR: 18 (31 May 2020 06:06) (16 - 18)  SpO2: 99% (31 May 2020 06:06) (99% - 100%)    PHYSICAL EXAM:  GEN: NAD, laying in bed, appears stated age  HEENT: NCAT, clear oral mucosa, normal conjunctiva  Chest: non-tender  CV:  non-tachycardic, 2+ radial pulse  Pulm: no increased work of breathing, no conversational dyspnea  GI: soft, non-tender, non-distended, no masses, negative Soto's  MSK: moving all extremities     LABS:                        10.8   16.44 )-----------( 408      ( 31 May 2020 09:17 )             31.1         131<L>  |  95<L>  |  23.0<H>  ----------------------------<  158<H>  4.1   |  19.0<L>  |  1.21    Ca    8.4<L>      31 May 2020 09:17    TPro  6.7  /  Alb  4.1  /  TBili  0.4  /  DBili  x   /  AST  15  /  ALT  12  /  AlkPhos  74  05      Urinalysis Basic - ( 31 May 2020 04:27 )    Color: Yellow / Appearance: Clear / S.005 / pH: x  Gluc: x / Ketone: Negative  / Bili: Negative / Urobili: Negative   Blood: x / Protein: 100 / Nitrite: Positive   Leuk Esterase: Small / RBC: 3-5 /HPF / WBC 6-10   Sq Epi: x / Non Sq Epi: Occasional / Bacteria: Moderate        RADIOLOGY & ADDITIONAL STUDIES:  CTAP: distended gallbladder with stones  RUQ US: distended gallbladder with stones, normal gallbladder wall, no pericholecystic fluid, no evidence of impacted stone, normal CBD

## 2020-06-01 LAB
A1C WITH ESTIMATED AVERAGE GLUCOSE RESULT: 6.6 % — HIGH (ref 4–5.6)
ALBUMIN SERPL ELPH-MCNC: 3.3 G/DL — SIGNIFICANT CHANGE UP (ref 3.3–5.2)
ALP SERPL-CCNC: 55 U/L — SIGNIFICANT CHANGE UP (ref 40–120)
ALT FLD-CCNC: 10 U/L — SIGNIFICANT CHANGE UP
ANION GAP SERPL CALC-SCNC: 15 MMOL/L — SIGNIFICANT CHANGE UP (ref 5–17)
AST SERPL-CCNC: 18 U/L — SIGNIFICANT CHANGE UP
BILIRUB SERPL-MCNC: 0.6 MG/DL — SIGNIFICANT CHANGE UP (ref 0.4–2)
BUN SERPL-MCNC: 14 MG/DL — SIGNIFICANT CHANGE UP (ref 8–20)
CALCIUM SERPL-MCNC: 6.8 MG/DL — LOW (ref 8.6–10.2)
CHLORIDE SERPL-SCNC: 100 MMOL/L — SIGNIFICANT CHANGE UP (ref 98–107)
CO2 SERPL-SCNC: 18 MMOL/L — LOW (ref 22–29)
CREAT SERPL-MCNC: 0.9 MG/DL — SIGNIFICANT CHANGE UP (ref 0.5–1.3)
ESTIMATED AVERAGE GLUCOSE: 143 MG/DL — HIGH (ref 68–114)
GLUCOSE BLDC GLUCOMTR-MCNC: 131 MG/DL — HIGH (ref 70–99)
GLUCOSE BLDC GLUCOMTR-MCNC: 157 MG/DL — HIGH (ref 70–99)
GLUCOSE BLDC GLUCOMTR-MCNC: 159 MG/DL — HIGH (ref 70–99)
GLUCOSE BLDC GLUCOMTR-MCNC: 162 MG/DL — HIGH (ref 70–99)
GLUCOSE SERPL-MCNC: 111 MG/DL — HIGH (ref 70–99)
HCT VFR BLD CALC: 31.1 % — LOW (ref 34.5–45)
HGB BLD-MCNC: 10.7 G/DL — LOW (ref 11.5–15.5)
MCHC RBC-ENTMCNC: 32.1 PG — SIGNIFICANT CHANGE UP (ref 27–34)
MCHC RBC-ENTMCNC: 34.4 GM/DL — SIGNIFICANT CHANGE UP (ref 32–36)
MCV RBC AUTO: 93.4 FL — SIGNIFICANT CHANGE UP (ref 80–100)
PLATELET # BLD AUTO: 398 K/UL — SIGNIFICANT CHANGE UP (ref 150–400)
POTASSIUM SERPL-MCNC: 3 MMOL/L — LOW (ref 3.5–5.3)
POTASSIUM SERPL-SCNC: 3 MMOL/L — LOW (ref 3.5–5.3)
PROT SERPL-MCNC: 5.3 G/DL — LOW (ref 6.6–8.7)
RBC # BLD: 3.33 M/UL — LOW (ref 3.8–5.2)
RBC # FLD: 12.5 % — SIGNIFICANT CHANGE UP (ref 10.3–14.5)
SODIUM SERPL-SCNC: 133 MMOL/L — LOW (ref 135–145)
WBC # BLD: 14.04 K/UL — HIGH (ref 3.8–10.5)
WBC # FLD AUTO: 14.04 K/UL — HIGH (ref 3.8–10.5)

## 2020-06-01 PROCEDURE — 78226 HEPATOBILIARY SYSTEM IMAGING: CPT | Mod: 26

## 2020-06-01 PROCEDURE — 99232 SBSQ HOSP IP/OBS MODERATE 35: CPT

## 2020-06-01 RX ORDER — POTASSIUM CHLORIDE 20 MEQ
20 PACKET (EA) ORAL
Refills: 0 | Status: COMPLETED | OUTPATIENT
Start: 2020-06-01 | End: 2020-06-01

## 2020-06-01 RX ORDER — POTASSIUM CHLORIDE 20 MEQ
10 PACKET (EA) ORAL
Refills: 0 | Status: COMPLETED | OUTPATIENT
Start: 2020-06-01 | End: 2020-06-01

## 2020-06-01 RX ADMIN — Medication 60 MILLIGRAM(S): at 04:56

## 2020-06-01 RX ADMIN — PIPERACILLIN AND TAZOBACTAM 25 GRAM(S): 4; .5 INJECTION, POWDER, LYOPHILIZED, FOR SOLUTION INTRAVENOUS at 22:10

## 2020-06-01 RX ADMIN — PIPERACILLIN AND TAZOBACTAM 25 GRAM(S): 4; .5 INJECTION, POWDER, LYOPHILIZED, FOR SOLUTION INTRAVENOUS at 02:57

## 2020-06-01 RX ADMIN — Medication 100 MILLIEQUIVALENT(S): at 20:51

## 2020-06-01 RX ADMIN — ENOXAPARIN SODIUM 40 MILLIGRAM(S): 100 INJECTION SUBCUTANEOUS at 17:43

## 2020-06-01 RX ADMIN — Medication 100 MILLIEQUIVALENT(S): at 19:50

## 2020-06-01 RX ADMIN — Medication 100 MILLIEQUIVALENT(S): at 17:43

## 2020-06-01 RX ADMIN — Medication 2: at 18:16

## 2020-06-01 RX ADMIN — Medication 81 MILLIGRAM(S): at 17:42

## 2020-06-01 RX ADMIN — PIPERACILLIN AND TAZOBACTAM 25 GRAM(S): 4; .5 INJECTION, POWDER, LYOPHILIZED, FOR SOLUTION INTRAVENOUS at 17:43

## 2020-06-01 RX ADMIN — Medication 20 MILLIEQUIVALENT(S): at 17:43

## 2020-06-01 RX ADMIN — Medication 25 MILLIGRAM(S): at 04:56

## 2020-06-01 RX ADMIN — ESCITALOPRAM OXALATE 20 MILLIGRAM(S): 10 TABLET, FILM COATED ORAL at 17:42

## 2020-06-01 RX ADMIN — Medication 20 MILLIEQUIVALENT(S): at 17:44

## 2020-06-01 RX ADMIN — Medication 20 MILLIEQUIVALENT(S): at 18:52

## 2020-06-01 RX ADMIN — ATORVASTATIN CALCIUM 10 MILLIGRAM(S): 80 TABLET, FILM COATED ORAL at 22:10

## 2020-06-01 NOTE — PROGRESS NOTE ADULT - SUBJECTIVE AND OBJECTIVE BOX
HPI/OVERNIGHT EVENTS:    HIDA negative. Pt states no pain, AO x1. Afebrile during the day.     MEDICATIONS  (STANDING):  aspirin  chewable 81 milliGRAM(s) Oral daily  atorvastatin 10 milliGRAM(s) Oral at bedtime  dextrose 5%. 1000 milliLiter(s) (50 mL/Hr) IV Continuous <Continuous>  dextrose 50% Injectable 12.5 Gram(s) IV Push once  dextrose 50% Injectable 25 Gram(s) IV Push once  dextrose 50% Injectable 25 Gram(s) IV Push once  enoxaparin Injectable 40 milliGRAM(s) SubCutaneous daily  escitalopram 20 milliGRAM(s) Oral daily  insulin lispro (HumaLOG) corrective regimen sliding scale   SubCutaneous three times a day before meals  insulin lispro (HumaLOG) corrective regimen sliding scale   SubCutaneous at bedtime  metoprolol succinate ER 25 milliGRAM(s) Oral daily  NIFEdipine XL 60 milliGRAM(s) Oral daily  piperacillin/tazobactam IVPB.. 3.375 Gram(s) IV Intermittent every 8 hours  potassium chloride    Tablet ER 20 milliEquivalent(s) Oral every 2 hours  potassium chloride  10 mEq/100 mL IVPB 10 milliEquivalent(s) IV Intermittent every 1 hour  sodium chloride 0.9%. 1000 milliLiter(s) (50 mL/Hr) IV Continuous <Continuous>    MEDICATIONS  (PRN):  dextrose 40% Gel 15 Gram(s) Oral once PRN Blood Glucose LESS THAN 70 milliGRAM(s)/deciliter  glucagon  Injectable 1 milliGRAM(s) IntraMuscular once PRN Glucose LESS THAN 70 milligrams/deciliter  traZODone 50 milliGRAM(s) Oral at bedtime PRN insomnia      Vital Signs Last 24 Hrs  T(C): 36.8 (2020 07:28), Max: 36.9 (31 May 2020 23:20)  T(F): 98.2 (2020 07:28), Max: 98.4 (31 May 2020 23:20)  HR: 77 (2020 07:28) (71 - 80)  BP: 184/82 (2020 07:28) (163/76 - 184/82)  BP(mean): --  RR: 18 (2020 07:28) (18 - 18)  SpO2: 98% (2020 07:28) (98% - 99%)    Constitutional: patient resting comfortably in bed, in no acute distress  HEENT: EOMI / PERRL b/l, no active drainage or redness  Neck: No JVD, full ROM without pain  Respiratory: respirations are unlabored, no accessory muscle use, no conversational dyspnea  Cardiovascular: regular rate & rhythm  Gastrointestinal: Abdomen soft, non-tender, non-distended, no rebound tenderness / guarding  Neurological: GCS: 15 (4/5/6). A&O x 3; no gross sensory / motor / coordination deficits  Psychiatric: Normal mood, normal affect  Musculoskeletal: No joint pain, swelling or deformity; no limitation of movement      I&O's Detail      LABS:                        10.7   14.04 )-----------( 398      ( 2020 09:21 )             31.1     06-01    133<L>  |  100  |  14.0  ----------------------------<  111<H>  3.0<L>   |  18.0<L>  |  0.90    Ca    6.8<L>      2020 09:21    TPro  5.3<L>  /  Alb  3.3  /  TBili  0.6  /  DBili  x   /  AST  18  /  ALT  10  /  AlkPhos  55  06-01      Urinalysis Basic - ( 31 May 2020 04:27 )    Color: Yellow / Appearance: Clear / S.005 / pH: x  Gluc: x / Ketone: Negative  / Bili: Negative / Urobili: Negative   Blood: x / Protein: 100 / Nitrite: Positive   Leuk Esterase: Small / RBC: 3-5 /HPF / WBC 6-10   Sq Epi: x / Non Sq Epi: Occasional / Bacteria: Moderate HPI/OVERNIGHT EVENTS:    Pt examined at bedside, GEOVANY, no complains. Pt is disorientes and noncohesive speech. Denies pain, v/n/c/f. HIDA is negative. AO x1. Afebrile during the day. Surgery will sign off.     MEDICATIONS  (STANDING):  aspirin  chewable 81 milliGRAM(s) Oral daily  atorvastatin 10 milliGRAM(s) Oral at bedtime  dextrose 5%. 1000 milliLiter(s) (50 mL/Hr) IV Continuous <Continuous>  dextrose 50% Injectable 12.5 Gram(s) IV Push once  dextrose 50% Injectable 25 Gram(s) IV Push once  dextrose 50% Injectable 25 Gram(s) IV Push once  enoxaparin Injectable 40 milliGRAM(s) SubCutaneous daily  escitalopram 20 milliGRAM(s) Oral daily  insulin lispro (HumaLOG) corrective regimen sliding scale   SubCutaneous three times a day before meals  insulin lispro (HumaLOG) corrective regimen sliding scale   SubCutaneous at bedtime  metoprolol succinate ER 25 milliGRAM(s) Oral daily  NIFEdipine XL 60 milliGRAM(s) Oral daily  piperacillin/tazobactam IVPB.. 3.375 Gram(s) IV Intermittent every 8 hours  potassium chloride    Tablet ER 20 milliEquivalent(s) Oral every 2 hours  potassium chloride  10 mEq/100 mL IVPB 10 milliEquivalent(s) IV Intermittent every 1 hour  sodium chloride 0.9%. 1000 milliLiter(s) (50 mL/Hr) IV Continuous <Continuous>    MEDICATIONS  (PRN):  dextrose 40% Gel 15 Gram(s) Oral once PRN Blood Glucose LESS THAN 70 milliGRAM(s)/deciliter  glucagon  Injectable 1 milliGRAM(s) IntraMuscular once PRN Glucose LESS THAN 70 milligrams/deciliter  traZODone 50 milliGRAM(s) Oral at bedtime PRN insomnia      Vital Signs Last 24 Hrs  T(C): 36.8 (2020 07:28), Max: 36.9 (31 May 2020 23:20)  T(F): 98.2 (2020 07:28), Max: 98.4 (31 May 2020 23:20)  HR: 77 (2020 07:28) (71 - 80)  BP: 184/82 (2020 07:28) (163/76 - 184/82)  BP(mean): --  RR: 18 (2020 07:28) (18 - 18)  SpO2: 98% (2020 07:28) (98% - 99%)    Constitutional: patient resting comfortably in bed, in no acute distress  HEENT: EOMI / PERRL b/l, no active drainage or redness  Neck: No JVD, full ROM without pain  Respiratory: respirations are unlabored, no accessory muscle use, no conversational dyspnea  Cardiovascular: regular rate & rhythm  Gastrointestinal: Abdomen soft, non-tender, non-distended, no rebound tenderness / guarding  Neurological: GCS: 15 (4/5/6). A&O x 3; no gross sensory / motor / coordination deficits  Psychiatric: Normal mood, normal affect  Musculoskeletal: No joint pain, swelling or deformity; no limitation of movement      I&O's Detail      LABS:                        10.7   14.04 )-----------( 398      ( 2020 09:21 )             31.1     06-01    133<L>  |  100  |  14.0  ----------------------------<  111<H>  3.0<L>   |  18.0<L>  |  0.90    Ca    6.8<L>      2020 09:21    TPro  5.3<L>  /  Alb  3.3  /  TBili  0.6  /  DBili  x   /  AST  18  /  ALT  10  /  AlkPhos  55  06-01      Urinalysis Basic - ( 31 May 2020 04:27 )    Color: Yellow / Appearance: Clear / S.005 / pH: x  Gluc: x / Ketone: Negative  / Bili: Negative / Urobili: Negative   Blood: x / Protein: 100 / Nitrite: Positive   Leuk Esterase: Small / RBC: 3-5 /HPF / WBC 6-10   Sq Epi: x / Non Sq Epi: Occasional / Bacteria: Moderate

## 2020-06-01 NOTE — PROGRESS NOTE ADULT - ASSESSMENT
86yo female with Suprapubic abdominal pain, leukocytosis, +UA, hypovolemic hyponatremia, MEREDITH likely secondary to UTI.  Unlikely cholecystitis given normal ultrasound and exam, however agree with HIDA scan to definitively evaluate gallbladder filling and ejection. HIDA was nevative on 6/1.     No acute cholecystitis at this point.   Surgery will sign off.     Thank you for the opportunity of taking care of your patient. Any questions please call.     Dr. Burrows agrees.

## 2020-06-01 NOTE — PROGRESS NOTE ADULT - ATTENDING COMMENTS
Patient seen and examined with surgery team. Above note reviewed and edited where appropriate.     HIDA negative  Surgery team to sign off. Please reconsult as needed

## 2020-06-01 NOTE — PROGRESS NOTE ADULT - ASSESSMENT
#abnormal EKG   cardio consult   new T wave inversion   continue with medical management   patient is asymptomatic   plan is to do stress test     #. Possible UTI   will follow urine culture   no pyelonephritis   switch h to zosyn     # possible cholecystitis   imaging shows distended gall bladder , patient came in with abdominal pain   leukocytosis   will do HIDA , surgery input appreciated    on  zosyn   follow  blood cultures     # MEREDITH   will do gentle hydration   DC lisinopril   labs pending     # HTN   on home regimen , hold off on ACE   blood pressure uncontrolled , will have to add other medications     # DLP  on statin     # Depression / dementia   on home regimen     # DVT prophylaxis  lovenox     family called and updated #abnormal EKG   cardio on board   new T wave inversion   continue with medical management   patient is asymptomatic   plan is to do stress test but unable to do for 2 days due to HIDA scan     #. Possible UTI   will follow urine culture   no pyelonephritis   on  zosyn     # possible cholecystitis   imaging shows distended gall bladder , patient came in with abdominal pain   leukocytosis   HIDA normal   seen by surgery     # MEREDITH   improved   DC fluids     # HTN   on home regimen , hold off on ACE   blood pressure uncontrolled , will have to add other medications     # DLP  on statin     # Depression / dementia   on home regimen     # DVT prophylaxis  lovenox     family called and updated

## 2020-06-01 NOTE — PROGRESS NOTE ADULT - SUBJECTIVE AND OBJECTIVE BOX
CC: abdominal pain     INTERVAL HPI/OVERNIGHT EVENTS: seen and examined , feels ok , denies any complaints   still pleasantly confused           Vital Signs Last 24 Hrs  T(C): 36.8 (2020 07:28), Max: 37 (31 May 2020 15:17)  T(F): 98.2 (2020 07:28), Max: 98.6 (31 May 2020 15:17)  HR: 77 (2020 07:28) (71 - 80)  BP: 184/82 (2020 07:28) (142/73 - 184/82)  BP(mean): --  RR: 18 (2020 07:28) (18 - 18)  SpO2: 98% (2020 07:28) (97% - 99%)    PHYSICAL EXAM:    GENERAL: NAD, resting comfortable in bed   CHEST/LUNG: CTAB , no wheezing , rales, rhonchi heard   HEART: S1S2+, Regular rate and rhythm  ABDOMEN: Soft, Nontender, Nondistended; Bowel sounds present  EXTREMITIES:  no pitting edema , pulses palpated BL.        LABS:                        10.8   16.44 )-----------( 408      ( 31 May 2020 09:17 )             31.1     05-31    131<L>  |  95<L>  |  23.0<H>  ----------------------------<  158<H>  4.1   |  19.0<L>  |  1.21    Ca    8.4<L>      31 May 2020 09:17    TPro  6.7  /  Alb  4.1  /  TBili  0.4  /  DBili  x   /  AST  15  /  ALT  12  /  AlkPhos  74  05-31      Urinalysis Basic - ( 31 May 2020 04:27 )    Color: Yellow / Appearance: Clear / S.005 / pH: x  Gluc: x / Ketone: Negative  / Bili: Negative / Urobili: Negative   Blood: x / Protein: 100 / Nitrite: Positive   Leuk Esterase: Small / RBC: 3-5 /HPF / WBC 6-10   Sq Epi: x / Non Sq Epi: Occasional / Bacteria: Moderate          MEDICATIONS  (STANDING):  aspirin  chewable 81 milliGRAM(s) Oral daily  atorvastatin 10 milliGRAM(s) Oral at bedtime  dextrose 5%. 1000 milliLiter(s) (50 mL/Hr) IV Continuous <Continuous>  dextrose 50% Injectable 12.5 Gram(s) IV Push once  dextrose 50% Injectable 25 Gram(s) IV Push once  dextrose 50% Injectable 25 Gram(s) IV Push once  enoxaparin Injectable 40 milliGRAM(s) SubCutaneous daily  escitalopram 20 milliGRAM(s) Oral daily  insulin lispro (HumaLOG) corrective regimen sliding scale   SubCutaneous three times a day before meals  insulin lispro (HumaLOG) corrective regimen sliding scale   SubCutaneous at bedtime  metoprolol succinate ER 25 milliGRAM(s) Oral daily  NIFEdipine XL 60 milliGRAM(s) Oral daily  piperacillin/tazobactam IVPB.. 3.375 Gram(s) IV Intermittent every 8 hours  sodium chloride 0.9%. 1000 milliLiter(s) (50 mL/Hr) IV Continuous <Continuous>    MEDICATIONS  (PRN):  dextrose 40% Gel 15 Gram(s) Oral once PRN Blood Glucose LESS THAN 70 milliGRAM(s)/deciliter  glucagon  Injectable 1 milliGRAM(s) IntraMuscular once PRN Glucose LESS THAN 70 milligrams/deciliter  traZODone 50 milliGRAM(s) Oral at bedtime PRN insomnia      RADIOLOGY & ADDITIONAL TESTS:

## 2020-06-02 LAB
-  AMIKACIN: SIGNIFICANT CHANGE UP
-  AMPICILLIN/SULBACTAM: SIGNIFICANT CHANGE UP
-  AMPICILLIN: SIGNIFICANT CHANGE UP
-  AZTREONAM: SIGNIFICANT CHANGE UP
-  CEFAZOLIN: SIGNIFICANT CHANGE UP
-  CEFEPIME: SIGNIFICANT CHANGE UP
-  CEFOXITIN: SIGNIFICANT CHANGE UP
-  CEFTRIAXONE: SIGNIFICANT CHANGE UP
-  CIPROFLOXACIN: SIGNIFICANT CHANGE UP
-  GENTAMICIN: SIGNIFICANT CHANGE UP
-  IMIPENEM: SIGNIFICANT CHANGE UP
-  LEVOFLOXACIN: SIGNIFICANT CHANGE UP
-  MEROPENEM: SIGNIFICANT CHANGE UP
-  NITROFURANTOIN: SIGNIFICANT CHANGE UP
-  PIPERACILLIN/TAZOBACTAM: SIGNIFICANT CHANGE UP
-  TIGECYCLINE: SIGNIFICANT CHANGE UP
-  TOBRAMYCIN: SIGNIFICANT CHANGE UP
-  TRIMETHOPRIM/SULFAMETHOXAZOLE: SIGNIFICANT CHANGE UP
ANION GAP SERPL CALC-SCNC: 13 MMOL/L — SIGNIFICANT CHANGE UP (ref 5–17)
BUN SERPL-MCNC: 12 MG/DL — SIGNIFICANT CHANGE UP (ref 8–20)
CALCIUM SERPL-MCNC: 8.3 MG/DL — LOW (ref 8.6–10.2)
CHLORIDE SERPL-SCNC: 97 MMOL/L — LOW (ref 98–107)
CO2 SERPL-SCNC: 21 MMOL/L — LOW (ref 22–29)
CREAT SERPL-MCNC: 1.18 MG/DL — SIGNIFICANT CHANGE UP (ref 0.5–1.3)
CULTURE RESULTS: SIGNIFICANT CHANGE UP
GLUCOSE BLDC GLUCOMTR-MCNC: 135 MG/DL — HIGH (ref 70–99)
GLUCOSE BLDC GLUCOMTR-MCNC: 140 MG/DL — HIGH (ref 70–99)
GLUCOSE BLDC GLUCOMTR-MCNC: 143 MG/DL — HIGH (ref 70–99)
GLUCOSE BLDC GLUCOMTR-MCNC: 207 MG/DL — HIGH (ref 70–99)
GLUCOSE SERPL-MCNC: 105 MG/DL — HIGH (ref 70–99)
HCT VFR BLD CALC: 29.9 % — LOW (ref 34.5–45)
HGB BLD-MCNC: 10.1 G/DL — LOW (ref 11.5–15.5)
MCHC RBC-ENTMCNC: 31.5 PG — SIGNIFICANT CHANGE UP (ref 27–34)
MCHC RBC-ENTMCNC: 33.8 GM/DL — SIGNIFICANT CHANGE UP (ref 32–36)
MCV RBC AUTO: 93.1 FL — SIGNIFICANT CHANGE UP (ref 80–100)
METHOD TYPE: SIGNIFICANT CHANGE UP
ORGANISM # SPEC MICROSCOPIC CNT: SIGNIFICANT CHANGE UP
ORGANISM # SPEC MICROSCOPIC CNT: SIGNIFICANT CHANGE UP
PLATELET # BLD AUTO: 380 K/UL — SIGNIFICANT CHANGE UP (ref 150–400)
POTASSIUM SERPL-MCNC: 4.5 MMOL/L — SIGNIFICANT CHANGE UP (ref 3.5–5.3)
POTASSIUM SERPL-SCNC: 4.5 MMOL/L — SIGNIFICANT CHANGE UP (ref 3.5–5.3)
RBC # BLD: 3.21 M/UL — LOW (ref 3.8–5.2)
RBC # FLD: 12.5 % — SIGNIFICANT CHANGE UP (ref 10.3–14.5)
SODIUM SERPL-SCNC: 131 MMOL/L — LOW (ref 135–145)
SPECIMEN SOURCE: SIGNIFICANT CHANGE UP
WBC # BLD: 14.06 K/UL — HIGH (ref 3.8–10.5)
WBC # FLD AUTO: 14.06 K/UL — HIGH (ref 3.8–10.5)

## 2020-06-02 PROCEDURE — 99232 SBSQ HOSP IP/OBS MODERATE 35: CPT

## 2020-06-02 PROCEDURE — 93351 STRESS TTE COMPLETE: CPT | Mod: 26

## 2020-06-02 RX ORDER — SACCHAROMYCES BOULARDII 250 MG
250 POWDER IN PACKET (EA) ORAL
Refills: 0 | Status: DISCONTINUED | OUTPATIENT
Start: 2020-06-02 | End: 2020-06-04

## 2020-06-02 RX ORDER — CEFTRIAXONE 500 MG/1
1000 INJECTION, POWDER, FOR SOLUTION INTRAMUSCULAR; INTRAVENOUS EVERY 24 HOURS
Refills: 0 | Status: DISCONTINUED | OUTPATIENT
Start: 2020-06-02 | End: 2020-06-04

## 2020-06-02 RX ORDER — DOBUTAMINE HCL 250MG/20ML
10 VIAL (ML) INTRAVENOUS
Qty: 500 | Refills: 0 | Status: DISCONTINUED | OUTPATIENT
Start: 2020-06-02 | End: 2020-06-04

## 2020-06-02 RX ADMIN — PIPERACILLIN AND TAZOBACTAM 25 GRAM(S): 4; .5 INJECTION, POWDER, LYOPHILIZED, FOR SOLUTION INTRAVENOUS at 05:13

## 2020-06-02 RX ADMIN — Medication 250 MILLIGRAM(S): at 17:32

## 2020-06-02 RX ADMIN — ESCITALOPRAM OXALATE 20 MILLIGRAM(S): 10 TABLET, FILM COATED ORAL at 11:44

## 2020-06-02 RX ADMIN — ENOXAPARIN SODIUM 40 MILLIGRAM(S): 100 INJECTION SUBCUTANEOUS at 11:43

## 2020-06-02 RX ADMIN — Medication 81 MILLIGRAM(S): at 11:43

## 2020-06-02 RX ADMIN — Medication 60 MILLIGRAM(S): at 05:14

## 2020-06-02 RX ADMIN — ATORVASTATIN CALCIUM 10 MILLIGRAM(S): 80 TABLET, FILM COATED ORAL at 21:26

## 2020-06-02 RX ADMIN — SODIUM CHLORIDE 50 MILLILITER(S): 9 INJECTION INTRAMUSCULAR; INTRAVENOUS; SUBCUTANEOUS at 05:14

## 2020-06-02 RX ADMIN — Medication 25 MILLIGRAM(S): at 05:14

## 2020-06-02 RX ADMIN — CEFTRIAXONE 100 MILLIGRAM(S): 500 INJECTION, POWDER, FOR SOLUTION INTRAMUSCULAR; INTRAVENOUS at 15:00

## 2020-06-02 NOTE — PROGRESS NOTE ADULT - ASSESSMENT
#abnormal EKG   cardio on board   new T wave inversion   continue with medical management   patient is asymptomatic   plan is to do stress test but unable to do for 2 days due to HIDA scan     #. UTI   culture showed pan sensitive E coli   switch to ceftriaxone , will need total 7 days     # possible cholecystitis   ruled out on HIDA scan     # MEREDITH   resolved     # HTN   on home regimen , hold off on ACE      # DLP  on statin     # Depression / dementia   on home regimen     # DVT prophylaxis  lovenox     # Disposition   CM involved [facility] versus home     family called and updated

## 2020-06-02 NOTE — PROGRESS NOTE ADULT - SUBJECTIVE AND OBJECTIVE BOX
CC: UTI     INTERVAL HPI/OVERNIGHT EVENTS: seen and examined , feels ok , no complains   remains around her baseline           Vital Signs Last 24 Hrs  T(C): 36.8 (02 Jun 2020 07:46), Max: 37.2 (01 Jun 2020 22:06)  T(F): 98.3 (02 Jun 2020 07:46), Max: 98.9 (01 Jun 2020 22:06)  HR: 76 (02 Jun 2020 07:46) (70 - 83)  BP: 108/61 (02 Jun 2020 07:46) (108/61 - 157/86)  BP(mean): --  RR: 18 (02 Jun 2020 07:46) (18 - 20)  SpO2: 97% (01 Jun 2020 22:06) (97% - 98%)    PHYSICAL EXAM:    GENERAL: NAD, resting comfortable in bed   CHEST/LUNG: CTAB , no wheezing , rales, rhonchi heard   HEART: S1S2+, Regular rate and rhythm  ABDOMEN: Soft, Nontender, Nondistended; Bowel sounds present  EXTREMITIES:  no pitting edema , pulses palpated BL.        LABS:                        10.1   14.06 )-----------( 380      ( 02 Jun 2020 06:34 )             29.9     06-02    131<L>  |  97<L>  |  12.0  ----------------------------<  105<H>  4.5   |  21.0<L>  |  1.18    Ca    8.3<L>      02 Jun 2020 06:34    TPro  5.3<L>  /  Alb  3.3  /  TBili  0.6  /  DBili  x   /  AST  18  /  ALT  10  /  AlkPhos  55  06-01            MEDICATIONS  (STANDING):  aspirin  chewable 81 milliGRAM(s) Oral daily  atorvastatin 10 milliGRAM(s) Oral at bedtime  cefTRIAXone   IVPB 1000 milliGRAM(s) IV Intermittent every 24 hours  dextrose 5%. 1000 milliLiter(s) (50 mL/Hr) IV Continuous <Continuous>  dextrose 50% Injectable 12.5 Gram(s) IV Push once  dextrose 50% Injectable 25 Gram(s) IV Push once  dextrose 50% Injectable 25 Gram(s) IV Push once  DOBUTamine Infusion 10 MICROgram(s)/kG/Min (17 mL/Hr) IV Continuous <Continuous>  enoxaparin Injectable 40 milliGRAM(s) SubCutaneous daily  escitalopram 20 milliGRAM(s) Oral daily  insulin lispro (HumaLOG) corrective regimen sliding scale   SubCutaneous three times a day before meals  insulin lispro (HumaLOG) corrective regimen sliding scale   SubCutaneous at bedtime  metoprolol succinate ER 25 milliGRAM(s) Oral daily  NIFEdipine XL 60 milliGRAM(s) Oral daily  saccharomyces boulardii 250 milliGRAM(s) Oral two times a day  sodium chloride 0.9%. 1000 milliLiter(s) (50 mL/Hr) IV Continuous <Continuous>    MEDICATIONS  (PRN):  dextrose 40% Gel 15 Gram(s) Oral once PRN Blood Glucose LESS THAN 70 milliGRAM(s)/deciliter  glucagon  Injectable 1 milliGRAM(s) IntraMuscular once PRN Glucose LESS THAN 70 milligrams/deciliter  traZODone 50 milliGRAM(s) Oral at bedtime PRN insomnia      RADIOLOGY & ADDITIONAL TESTS:

## 2020-06-03 ENCOUNTER — TRANSCRIPTION ENCOUNTER (OUTPATIENT)
Age: 85
End: 2020-06-03

## 2020-06-03 LAB
ANION GAP SERPL CALC-SCNC: 9 MMOL/L — SIGNIFICANT CHANGE UP (ref 5–17)
BUN SERPL-MCNC: 12 MG/DL — SIGNIFICANT CHANGE UP (ref 8–20)
CALCIUM SERPL-MCNC: 8.3 MG/DL — LOW (ref 8.6–10.2)
CHLORIDE SERPL-SCNC: 100 MMOL/L — SIGNIFICANT CHANGE UP (ref 98–107)
CO2 SERPL-SCNC: 20 MMOL/L — LOW (ref 22–29)
CREAT SERPL-MCNC: 1.23 MG/DL — SIGNIFICANT CHANGE UP (ref 0.5–1.3)
GLUCOSE BLDC GLUCOMTR-MCNC: 125 MG/DL — HIGH (ref 70–99)
GLUCOSE BLDC GLUCOMTR-MCNC: 140 MG/DL — HIGH (ref 70–99)
GLUCOSE BLDC GLUCOMTR-MCNC: 171 MG/DL — HIGH (ref 70–99)
GLUCOSE BLDC GLUCOMTR-MCNC: 176 MG/DL — HIGH (ref 70–99)
GLUCOSE SERPL-MCNC: 134 MG/DL — HIGH (ref 70–99)
HCT VFR BLD CALC: 29.8 % — LOW (ref 34.5–45)
HGB BLD-MCNC: 9.9 G/DL — LOW (ref 11.5–15.5)
MCHC RBC-ENTMCNC: 31.2 PG — SIGNIFICANT CHANGE UP (ref 27–34)
MCHC RBC-ENTMCNC: 33.2 GM/DL — SIGNIFICANT CHANGE UP (ref 32–36)
MCV RBC AUTO: 94 FL — SIGNIFICANT CHANGE UP (ref 80–100)
PLATELET # BLD AUTO: 349 K/UL — SIGNIFICANT CHANGE UP (ref 150–400)
POTASSIUM SERPL-MCNC: 4.4 MMOL/L — SIGNIFICANT CHANGE UP (ref 3.5–5.3)
POTASSIUM SERPL-SCNC: 4.4 MMOL/L — SIGNIFICANT CHANGE UP (ref 3.5–5.3)
RBC # BLD: 3.17 M/UL — LOW (ref 3.8–5.2)
RBC # FLD: 12.5 % — SIGNIFICANT CHANGE UP (ref 10.3–14.5)
SODIUM SERPL-SCNC: 129 MMOL/L — LOW (ref 135–145)
WBC # BLD: 12.43 K/UL — HIGH (ref 3.8–10.5)
WBC # FLD AUTO: 12.43 K/UL — HIGH (ref 3.8–10.5)

## 2020-06-03 PROCEDURE — 99232 SBSQ HOSP IP/OBS MODERATE 35: CPT

## 2020-06-03 RX ORDER — CEFPODOXIME PROXETIL 100 MG
1 TABLET ORAL
Qty: 8 | Refills: 0
Start: 2020-06-03 | End: 2020-06-06

## 2020-06-03 RX ORDER — ASPIRIN/CALCIUM CARB/MAGNESIUM 324 MG
1 TABLET ORAL
Qty: 0 | Refills: 0 | DISCHARGE
Start: 2020-06-03

## 2020-06-03 RX ORDER — SACCHAROMYCES BOULARDII 250 MG
1 POWDER IN PACKET (EA) ORAL
Qty: 0 | Refills: 0 | DISCHARGE
Start: 2020-06-03

## 2020-06-03 RX ORDER — SODIUM CHLORIDE 9 MG/ML
1000 INJECTION INTRAMUSCULAR; INTRAVENOUS; SUBCUTANEOUS
Refills: 0 | Status: DISCONTINUED | OUTPATIENT
Start: 2020-06-03 | End: 2020-06-04

## 2020-06-03 RX ADMIN — ATORVASTATIN CALCIUM 10 MILLIGRAM(S): 80 TABLET, FILM COATED ORAL at 21:05

## 2020-06-03 RX ADMIN — Medication 250 MILLIGRAM(S): at 05:24

## 2020-06-03 RX ADMIN — Medication 60 MILLIGRAM(S): at 05:24

## 2020-06-03 RX ADMIN — Medication 250 MILLIGRAM(S): at 17:19

## 2020-06-03 RX ADMIN — Medication 81 MILLIGRAM(S): at 11:40

## 2020-06-03 RX ADMIN — SODIUM CHLORIDE 100 MILLILITER(S): 9 INJECTION INTRAMUSCULAR; INTRAVENOUS; SUBCUTANEOUS at 13:48

## 2020-06-03 RX ADMIN — ENOXAPARIN SODIUM 40 MILLIGRAM(S): 100 INJECTION SUBCUTANEOUS at 11:41

## 2020-06-03 RX ADMIN — CEFTRIAXONE 100 MILLIGRAM(S): 500 INJECTION, POWDER, FOR SOLUTION INTRAMUSCULAR; INTRAVENOUS at 13:49

## 2020-06-03 RX ADMIN — ESCITALOPRAM OXALATE 20 MILLIGRAM(S): 10 TABLET, FILM COATED ORAL at 11:41

## 2020-06-03 RX ADMIN — Medication 2: at 11:40

## 2020-06-03 RX ADMIN — Medication 2: at 16:50

## 2020-06-03 RX ADMIN — Medication 25 MILLIGRAM(S): at 05:24

## 2020-06-03 NOTE — PHYSICAL THERAPY INITIAL EVALUATION ADULT - GENERAL OBSERVATIONS, REHAB EVAL
Patient received lying in bed, NAD, breathing RA, +IV infusing, +tele. Pt confused but agreeable to Physical Therapy evaluation.

## 2020-06-03 NOTE — DISCHARGE NOTE PROVIDER - HOSPITAL COURSE
Hospital course :    85F hx demenita, HTN, HLD, DM2 presents with abdominal pain, nausea vomiting  and not feeling well. Given dementia patient was very poor historian. CT showed distended gall baldder , US normal , HIDA done negative , LFT normal , seen by surgery and signed off . her symptoms were related to UTI , remained on antibiotics , WBC improved , symptoms resolved , cultures showed pan sensitive E coli , blood cultures negative     antibiotics will be switched to vantin to complete course of therapy .     she also had abnormal EKG , underwent stress ECHO which was normal , cleared by cardio.    patient remained confused which is her baseline , this was discussed with BRYANNA Medina , patient will be discharged with home health aides.                 Physical Exam :    Vitals : reviewed , stable     GEN : age appropriate , resting , NAD     HEART: S1,S2 RRR     LUNGS: CTAB     ABDOMEN: Soft , NT , ND , positive bowel sounds     EXT: no pitting edema                 35 minutes were spent on discharge co ordination . Hospital course :    85F hx demenita, HTN, HLD, DM2 presents with abdominal pain, nausea vomiting  and not feeling well. Given dementia patient was very poor historian. CT showed distended gall baldder , US normal , HIDA done negative , LFT normal , seen by surgery and signed off . her symptoms were related to UTI , remained on antibiotics , WBC improved , symptoms resolved , cultures showed pan sensitive E coli , blood cultures negative     antibiotics will be switched to vantin to complete course of therapy . patient also had MEREDITH which improved with fluids , had hyponatremia which fluctuated     she also had abnormal EKG , underwent stress ECHO which was normal , cleared by cardio.    patient remained confused which is her baseline , this was discussed with BRYANNA Medina , patient will be discharged with home health aides.                 Physical Exam :    Vitals : reviewed , stable     GEN : age appropriate , resting , NAD     HEART: S1,S2 RRR     LUNGS: CTAB     ABDOMEN: Soft , NT , ND , positive bowel sounds     EXT: no pitting edema                 35 minutes were spent on discharge co ordination .

## 2020-06-03 NOTE — PROGRESS NOTE ADULT - ASSESSMENT
A/P:  86 y/o F with a PMHx of dementia, HTN, HLD, and DMII who presented to the ED with complaints of abdominal pain, nausea, and vomiting. Patient is a very poor historian, and has a hard time answering direct questions. Patient states she came in because "she was invited." Patient does note some abdominal pain still. Patient was found to have an EKG with N t wave inversion anteroseptally, but denies any chest pain or shortness of breath. Patient also found to have a UTI and possible cholecystitis. Patient unable to provide further ROS.   Troponin negative x 2    Abnormal EKG  - EKG with new t wave inversions   - Denies any chest pain or dyspnea.   - Troponins negative x 2  - Patient also with 1st degree AV block, short runs of Wenckebach on tele, and Bifasicular block  - TTE EF 60-35%, no RWMA  - Stress Echo neg for ischemia    HTN  - Continue home meds.     HLD  - Continue statin.     Patient cleared for DC from cardiac standpoint. To follow up in office.  Thank you for allowing me to participate in care of your patient.

## 2020-06-03 NOTE — PROGRESS NOTE ADULT - SUBJECTIVE AND OBJECTIVE BOX
CC: abnormal EKG     INTERVAL HPI/OVERNIGHT EVENTS: seen and examined , feels ok , no complains   just wants to rest          Vital Signs Last 24 Hrs  T(C): 36.8 (03 Jun 2020 08:08), Max: 37.2 (02 Jun 2020 15:50)  T(F): 98.3 (03 Jun 2020 08:08), Max: 98.9 (02 Jun 2020 15:50)  HR: 76 (03 Jun 2020 08:08) (76 - 82)  BP: 162/78 (03 Jun 2020 08:36) (146/80 - 185/91)  BP(mean): --  RR: 18 (03 Jun 2020 08:08) (18 - 20)  SpO2: 98% (03 Jun 2020 08:08) (96% - 99%)    PHYSICAL EXAM:    GENERAL: NAD, resting comfortable in bed   CHEST/LUNG: CTAB , no wheezing , rales, rhonchi heard   HEART: S1S2+, Regular rate and rhythm  ABDOMEN: Soft, Nontender, Nondistended; Bowel sounds present  EXTREMITIES:  no pitting edema , pulses palpated BL.        LABS:                        9.9    12.43 )-----------( 349      ( 03 Jun 2020 06:59 )             29.8     06-03    129<L>  |  100  |  12.0  ----------------------------<  134<H>  4.4   |  20.0<L>  |  1.23    Ca    8.3<L>      03 Jun 2020 06:59              MEDICATIONS  (STANDING):  aspirin  chewable 81 milliGRAM(s) Oral daily  atorvastatin 10 milliGRAM(s) Oral at bedtime  cefTRIAXone   IVPB 1000 milliGRAM(s) IV Intermittent every 24 hours  dextrose 5%. 1000 milliLiter(s) (50 mL/Hr) IV Continuous <Continuous>  dextrose 50% Injectable 12.5 Gram(s) IV Push once  dextrose 50% Injectable 25 Gram(s) IV Push once  dextrose 50% Injectable 25 Gram(s) IV Push once  DOBUTamine Infusion 10 MICROgram(s)/kG/Min (17 mL/Hr) IV Continuous <Continuous>  enoxaparin Injectable 40 milliGRAM(s) SubCutaneous daily  escitalopram 20 milliGRAM(s) Oral daily  insulin lispro (HumaLOG) corrective regimen sliding scale   SubCutaneous three times a day before meals  insulin lispro (HumaLOG) corrective regimen sliding scale   SubCutaneous at bedtime  metoprolol succinate ER 25 milliGRAM(s) Oral daily  NIFEdipine XL 60 milliGRAM(s) Oral daily  saccharomyces boulardii 250 milliGRAM(s) Oral two times a day  sodium chloride 0.9%. 1000 milliLiter(s) (100 mL/Hr) IV Continuous <Continuous>    MEDICATIONS  (PRN):  dextrose 40% Gel 15 Gram(s) Oral once PRN Blood Glucose LESS THAN 70 milliGRAM(s)/deciliter  glucagon  Injectable 1 milliGRAM(s) IntraMuscular once PRN Glucose LESS THAN 70 milligrams/deciliter  traZODone 50 milliGRAM(s) Oral at bedtime PRN insomnia      RADIOLOGY & ADDITIONAL TESTS:

## 2020-06-03 NOTE — PROGRESS NOTE ADULT - ASSESSMENT
#abnormal EKG   underwent stress ECHO , normal  cardio signed off      #. UTI   culture showed pan sensitive E coli   on IV ceftriaxone   will switch to oral tomorrow     # possible cholecystitis   ruled out on HIDA scan     # MEREDITH   resolved     # HTN   on home regimen , hold off on ACE      # DLP  on statin     # Depression / dementia   on home regimen     # DVT prophylaxis  lovenox     # Disposition   home with aides in am     family called and updated , also discussed with case management

## 2020-06-03 NOTE — PHYSICAL THERAPY INITIAL EVALUATION ADULT - GAIT DEVIATIONS NOTED, PT EVAL
shuffling/decreased weight-shifting ability/decreased stride length/decreased odalis/decreased step length

## 2020-06-03 NOTE — DISCHARGE NOTE NURSING/CASE MANAGEMENT/SOCIAL WORK - PATIENT PORTAL LINK FT
You can access the FollowMyHealth Patient Portal offered by [facility] by registering at the following website: [email or web address]. By joining [facility]’s FollowMyHealth portal, you will also be able to view your health information using other applications (apps) compatible with our system.

## 2020-06-03 NOTE — PROGRESS NOTE ADULT - SUBJECTIVE AND OBJECTIVE BOX
[facility]                                                               Office: [address] [address]                                                              Telephone: [phone]. Fax:[phone]                                                                             PROGRESS NOTE  Reason for follow up: Abnormal EKG  Overnight: No new events.   Update: 6/3: Patient resting comfortably, no cardiac complaints. Awaiting DC.      Review of symptoms:   Cardiac:  No chest pain. No dyspnea. No palpitations.  Respiratory: No cough. No dyspnea  Gastrointestinal: No diarrhea. No abdominal pain. No bleeding.     Past medical history: No updates.   	  Vitals:  T(C): 36.8 (06-03-20 @ 08:08), Max: 37.2 (06-02-20 @ 15:50)  HR: 76 (06-03-20 @ 08:08) (76 - 82)  BP: 162/78 (06-03-20 @ 08:36) (146/80 - 185/91)  RR: 18 (06-03-20 @ 08:08) (18 - 20)  SpO2: 98% (06-03-20 @ 08:08) (96% - 99%)  Wt(kg): --  I&O's Summary    02 Jun 2020 07:01  -  03 Jun 2020 07:00  --------------------------------------------------------  IN: 600 mL / OUT: 400 mL / NET: 200 mL      Weight (kg): 56.7 (05-31 @ 00:11)      PHYSICAL EXAM:  Appearance: Comfortable. No acute distress  HEENT:  Head and neck: Atraumatic. Normocephalic.  Normal oral mucosa, PERRL, Neck is supple. No JVD, No carotid bruit.   Neurologic: A&Ox 3, no focal deficits. EOMI, Cranial nerves are intact.  Lymphatic: No cervical lymphadenopathy  Cardiovascular: Normal S1 S2, No murmur, rubs/gallops. No JVD, No edema  Respiratory: Lungs clear to auscultation  Gastrointestinal:  Soft, Non-tender, + BS  Lower Extremities: No edema  Psychiatry: Patient is calm. No agitation. Mood & affect appropriate  Skin: No rashes/ecchymoses/cyanosis/ulcers visualized on the face, hands or feet.      CURRENT MEDICATIONS:  DOBUTamine Infusion 10 MICROgram(s)/kG/Min IV Continuous <Continuous>  metoprolol succinate ER 25 milliGRAM(s) Oral daily  NIFEdipine XL 60 milliGRAM(s) Oral daily    cefTRIAXone   IVPB  escitalopram  atorvastatin  dextrose 50% Injectable  dextrose 50% Injectable  dextrose 50% Injectable  insulin lispro (HumaLOG) corrective regimen sliding scale  insulin lispro (HumaLOG) corrective regimen sliding scale  aspirin  chewable  dextrose 5%.  enoxaparin Injectable  sodium chloride 0.9%.      DIAGNOSTIC TESTING:  [ ] Echocardiogram:  < from: TTE Echo Complete w/ Contrast w/ Doppler (06.01.20 @ 10:05) >    Summary:   1. Left ventricular ejection fraction, by visual estimation, is 60 to 65%.   2. Normal global left ventricular systolic function.   3. Normal left ventricular internal cavity size.   4. Spectral Doppler shows impaired relaxation pattern of left ventricular myocardial filling (Grade I diastolic dysfunction).   5. Normal left atrial size.   6. There is no evidence of pericardial effusion.   7. Mild thickening of the anterior and posterior mitral valve leaflets.   8. There is mild aortic root calcification.    MD Meme Electronically signed on 6/1/2020 at 5:11:10 PM       < end of copied text >     [ ]  Catheterization:  [ ] Stress Test:    < from: Stress Echocardiogram (06.02.20 @ 12:48) >  Summary:   1. Left ventricular ejection fraction, by visual estimation, is 55 to 60%.   2. Normal global left ventricular systolic function.   3. Negative stress echo for ischemia.      Portia DEGROOT Electronically signed on 6/3/2020 at 12:39:40 PM    < end of copied text >    OTHER: 	      LABS:	 	                            9.9    12.43 )-----------( 349      ( 03 Jun 2020 06:59 )             29.8     06-03    129<L>  |  100  |  12.0  ----------------------------<  134<H>  4.4   |  20.0<L>  |  1.23    Ca    8.3<L>      03 Jun 2020 06:59      proBNP:   Lipid Profile:   HgA1c:   TSH:       TELEMETRY: SR 70s, 1st degree AVB

## 2020-06-03 NOTE — DISCHARGE NOTE PROVIDER - CARE PROVIDER_API CALL
PCP,   Phone: (   )    -  Fax: (   )    -  Follow Up Time: 1-3 days    Michelet Casillas V  CARDIOLOGY  [address] [place] [id number]  Phone: [phone]  Fax: [phone]  Follow Up Time: 1 week

## 2020-06-03 NOTE — DISCHARGE NOTE PROVIDER - NSDCMRMEDTOKEN_GEN_ALL_CORE_FT
aspirin 81 mg oral tablet, chewable: 1 tab(s) orally once a day  atorvastatin 10 mg oral tablet: 1 tab(s) orally once a day  cefpodoxime 200 mg oral tablet: 1 tab(s) orally 2 times a day   escitalopram 20 mg oral tablet: 1 tab(s) orally once a day  metFORMIN:  orally   metFORMIN 1000 mg oral tablet: 1 tab(s) orally 2 times a day  metoprolol:  orally   metoprolol succinate 50 mg oral tablet, extended release: 1 tab(s) orally once a day  NIFEdipine 60 mg oral tablet, extended release: 1 tab(s) orally once a day  ramipril 10 mg oral capsule: 1 cap(s) orally once a day  saccharomyces boulardii lyo 250 mg oral capsule: 1 cap(s) orally 2 times a day  traZODone 100 mg oral tablet: 1 tab(s) orally 3 times a day aspirin 81 mg oral tablet, chewable: 1 tab(s) orally once a day  atorvastatin 10 mg oral tablet: 1 tab(s) orally once a day  cefpodoxime 200 mg oral tablet: 1 tab(s) orally 2 times a day   escitalopram 20 mg oral tablet: 1 tab(s) orally once a day  metFORMIN 1000 mg oral tablet: 1 tab(s) orally 2 times a day  metoprolol succinate 50 mg oral tablet, extended release: 1 tab(s) orally once a day  NIFEdipine 60 mg oral tablet, extended release: 1 tab(s) orally once a day  ramipril 10 mg oral capsule: 1 cap(s) orally once a day  saccharomyces boulardii lyo 250 mg oral capsule: 1 cap(s) orally 2 times a day  traZODone 100 mg oral tablet: 1 tab(s) orally 3 times a day

## 2020-06-03 NOTE — DISCHARGE NOTE PROVIDER - NSDCCPCAREPLAN_GEN_ALL_CORE_FT
PRINCIPAL DISCHARGE DIAGNOSIS  Diagnosis: T wave inversion in EKG  Assessment and Plan of Treatment:       SECONDARY DISCHARGE DIAGNOSES  Diagnosis: Urinary tract infection without hematuria, site unspecified  Assessment and Plan of Treatment:

## 2020-06-03 NOTE — PHYSICAL THERAPY INITIAL EVALUATION ADULT - ADDITIONAL COMMENTS
Patient is a poor historian secondary to hx of dementia. Per chart: patient lives in a house with 2 stairs, unclear of the presence of railing(s). She has 24/7 assistance from a live-in aide. Patient has cane, RW at home. Unable to obtain prior/baseline level of function.

## 2020-06-03 NOTE — PHYSICAL THERAPY INITIAL EVALUATION ADULT - DISCHARGE DISPOSITION, PT EVAL
continue 24/7 assist, would benefit from home Pt at this time to improve strength and balance/home w/ home PT/home w/ assist

## 2020-06-03 NOTE — DISCHARGE NOTE PROVIDER - PROVIDER TOKENS
FREE:[LAST:[PCP],PHONE:[(   )    -],FAX:[(   )    -],FOLLOWUP:[1-3 days]],PROVIDER:[TOKEN:[[id number]],FOLLOWUP:[1 week]]

## 2020-06-04 VITALS
SYSTOLIC BLOOD PRESSURE: 145 MMHG | DIASTOLIC BLOOD PRESSURE: 75 MMHG | RESPIRATION RATE: 18 BRPM | HEART RATE: 80 BPM | OXYGEN SATURATION: 100 % | TEMPERATURE: 99 F

## 2020-06-04 LAB
ANION GAP SERPL CALC-SCNC: 17 MMOL/L — SIGNIFICANT CHANGE UP (ref 5–17)
BUN SERPL-MCNC: 11 MG/DL — SIGNIFICANT CHANGE UP (ref 8–20)
CALCIUM SERPL-MCNC: 8.5 MG/DL — LOW (ref 8.6–10.2)
CHLORIDE SERPL-SCNC: 99 MMOL/L — SIGNIFICANT CHANGE UP (ref 98–107)
CO2 SERPL-SCNC: 18 MMOL/L — LOW (ref 22–29)
CREAT SERPL-MCNC: 1.18 MG/DL — SIGNIFICANT CHANGE UP (ref 0.5–1.3)
GLUCOSE BLDC GLUCOMTR-MCNC: 174 MG/DL — HIGH (ref 70–99)
GLUCOSE SERPL-MCNC: 148 MG/DL — HIGH (ref 70–99)
POTASSIUM SERPL-MCNC: 3.9 MMOL/L — SIGNIFICANT CHANGE UP (ref 3.5–5.3)
POTASSIUM SERPL-SCNC: 3.9 MMOL/L — SIGNIFICANT CHANGE UP (ref 3.5–5.3)
SODIUM SERPL-SCNC: 134 MMOL/L — LOW (ref 135–145)

## 2020-06-04 PROCEDURE — U0003: CPT

## 2020-06-04 PROCEDURE — 82962 GLUCOSE BLOOD TEST: CPT

## 2020-06-04 PROCEDURE — 87040 BLOOD CULTURE FOR BACTERIA: CPT

## 2020-06-04 PROCEDURE — C8929: CPT

## 2020-06-04 PROCEDURE — 87186 SC STD MICRODIL/AGAR DIL: CPT

## 2020-06-04 PROCEDURE — 85027 COMPLETE CBC AUTOMATED: CPT

## 2020-06-04 PROCEDURE — 99239 HOSP IP/OBS DSCHRG MGMT >30: CPT

## 2020-06-04 PROCEDURE — 80307 DRUG TEST PRSMV CHEM ANLYZR: CPT

## 2020-06-04 PROCEDURE — 96374 THER/PROPH/DIAG INJ IV PUSH: CPT

## 2020-06-04 PROCEDURE — 78226 HEPATOBILIARY SYSTEM IMAGING: CPT

## 2020-06-04 PROCEDURE — 80053 COMPREHEN METABOLIC PANEL: CPT

## 2020-06-04 PROCEDURE — 82550 ASSAY OF CK (CPK): CPT

## 2020-06-04 PROCEDURE — 99285 EMERGENCY DEPT VISIT HI MDM: CPT | Mod: 25

## 2020-06-04 PROCEDURE — 74176 CT ABD & PELVIS W/O CONTRAST: CPT

## 2020-06-04 PROCEDURE — 70450 CT HEAD/BRAIN W/O DYE: CPT

## 2020-06-04 PROCEDURE — 84484 ASSAY OF TROPONIN QUANT: CPT

## 2020-06-04 PROCEDURE — 93351 STRESS TTE COMPLETE: CPT

## 2020-06-04 PROCEDURE — 93005 ELECTROCARDIOGRAM TRACING: CPT

## 2020-06-04 PROCEDURE — 76705 ECHO EXAM OF ABDOMEN: CPT

## 2020-06-04 PROCEDURE — 81001 URINALYSIS AUTO W/SCOPE: CPT

## 2020-06-04 PROCEDURE — A9537: CPT

## 2020-06-04 PROCEDURE — 36415 COLL VENOUS BLD VENIPUNCTURE: CPT

## 2020-06-04 PROCEDURE — 86850 RBC ANTIBODY SCREEN: CPT

## 2020-06-04 PROCEDURE — 83036 HEMOGLOBIN GLYCOSYLATED A1C: CPT

## 2020-06-04 PROCEDURE — 86900 BLOOD TYPING SEROLOGIC ABO: CPT

## 2020-06-04 PROCEDURE — 96361 HYDRATE IV INFUSION ADD-ON: CPT

## 2020-06-04 PROCEDURE — 87086 URINE CULTURE/COLONY COUNT: CPT

## 2020-06-04 PROCEDURE — 86901 BLOOD TYPING SEROLOGIC RH(D): CPT

## 2020-06-04 PROCEDURE — 96375 TX/PRO/DX INJ NEW DRUG ADDON: CPT

## 2020-06-04 PROCEDURE — 97163 PT EVAL HIGH COMPLEX 45 MIN: CPT

## 2020-06-04 PROCEDURE — 83690 ASSAY OF LIPASE: CPT

## 2020-06-04 PROCEDURE — 80048 BASIC METABOLIC PNL TOTAL CA: CPT

## 2020-06-04 PROCEDURE — 71045 X-RAY EXAM CHEST 1 VIEW: CPT

## 2020-06-04 RX ORDER — METOPROLOL TARTRATE 50 MG
50 TABLET ORAL DAILY
Refills: 0 | Status: CANCELLED | OUTPATIENT
Start: 2020-06-05 | End: 2020-06-04

## 2020-06-04 RX ORDER — LISINOPRIL 2.5 MG/1
40 TABLET ORAL DAILY
Refills: 0 | Status: DISCONTINUED | OUTPATIENT
Start: 2020-06-04 | End: 2020-06-04

## 2020-06-04 RX ORDER — METOPROLOL TARTRATE 50 MG
25 TABLET ORAL ONCE
Refills: 0 | Status: COMPLETED | OUTPATIENT
Start: 2020-06-04 | End: 2020-06-04

## 2020-06-04 RX ADMIN — Medication 250 MILLIGRAM(S): at 05:07

## 2020-06-04 RX ADMIN — Medication 25 MILLIGRAM(S): at 10:35

## 2020-06-04 RX ADMIN — Medication 60 MILLIGRAM(S): at 05:07

## 2020-06-04 RX ADMIN — Medication 2: at 09:05

## 2020-06-04 RX ADMIN — Medication 25 MILLIGRAM(S): at 05:07

## 2020-06-05 LAB
CULTURE RESULTS: SIGNIFICANT CHANGE UP
CULTURE RESULTS: SIGNIFICANT CHANGE UP
SPECIMEN SOURCE: SIGNIFICANT CHANGE UP
SPECIMEN SOURCE: SIGNIFICANT CHANGE UP

## 2023-01-01 ENCOUNTER — INPATIENT (INPATIENT)
Facility: HOSPITAL | Age: 88
LOS: 0 days | DRG: 871 | End: 2023-04-07
Attending: HOSPITALIST | Admitting: HOSPITALIST
Payer: MEDICARE

## 2023-01-01 VITALS — WEIGHT: 100.09 LBS

## 2023-01-01 DIAGNOSIS — R57.0 CARDIOGENIC SHOCK: ICD-10-CM

## 2023-01-01 LAB
ALBUMIN SERPL ELPH-MCNC: 3 G/DL — LOW (ref 3.3–5.2)
ALBUMIN SERPL ELPH-MCNC: 3.4 G/DL — SIGNIFICANT CHANGE UP (ref 3.3–5.2)
ALP SERPL-CCNC: 117 U/L — SIGNIFICANT CHANGE UP (ref 40–120)
ALP SERPL-CCNC: 80 U/L — SIGNIFICANT CHANGE UP (ref 40–120)
ALT FLD-CCNC: 48 U/L — HIGH
ALT FLD-CCNC: 65 U/L — HIGH
ANION GAP SERPL CALC-SCNC: 28 MMOL/L — HIGH (ref 5–17)
ANION GAP SERPL CALC-SCNC: 30 MMOL/L — HIGH (ref 5–17)
ANION GAP SERPL CALC-SCNC: SIGNIFICANT CHANGE UP MMOL/L (ref 5–17)
ANISOCYTOSIS BLD QL: SIGNIFICANT CHANGE UP
ANISOCYTOSIS BLD QL: SLIGHT — SIGNIFICANT CHANGE UP
APTT BLD: 45.3 SEC — HIGH (ref 27.5–35.5)
AST SERPL-CCNC: 43 U/L — HIGH
AST SERPL-CCNC: 63 U/L — HIGH
BASE EXCESS BLDV CALC-SCNC: -28.8 MMOL/L — LOW (ref -2–3)
BASOPHILS # BLD AUTO: 0 K/UL — SIGNIFICANT CHANGE UP (ref 0–0.2)
BASOPHILS # BLD AUTO: 0.02 K/UL — SIGNIFICANT CHANGE UP (ref 0–0.2)
BASOPHILS NFR BLD AUTO: 0 % — SIGNIFICANT CHANGE UP (ref 0–2)
BASOPHILS NFR BLD AUTO: 0.3 % — SIGNIFICANT CHANGE UP (ref 0–2)
BILIRUB SERPL-MCNC: 0.2 MG/DL — LOW (ref 0.4–2)
BILIRUB SERPL-MCNC: 0.5 MG/DL — SIGNIFICANT CHANGE UP (ref 0.4–2)
BLD GP AB SCN SERPL QL: SIGNIFICANT CHANGE UP
BLD GP AB SCN SERPL QL: SIGNIFICANT CHANGE UP
BUN SERPL-MCNC: 127.5 MG/DL — HIGH (ref 8–20)
BUN SERPL-MCNC: 129.2 MG/DL — HIGH (ref 8–20)
BUN SERPL-MCNC: 145.3 MG/DL — HIGH (ref 8–20)
BURR CELLS BLD QL SMEAR: PRESENT — SIGNIFICANT CHANGE UP
CA-I SERPL-SCNC: 2.05 MMOL/L — CRITICAL HIGH (ref 1.15–1.33)
CALCIUM SERPL-MCNC: 10.2 MG/DL — SIGNIFICANT CHANGE UP (ref 8.4–10.5)
CALCIUM SERPL-MCNC: 10.5 MG/DL — SIGNIFICANT CHANGE UP (ref 8.4–10.5)
CALCIUM SERPL-MCNC: 13.4 MG/DL — CRITICAL HIGH (ref 8.4–10.5)
CHLORIDE BLDV-SCNC: 114 MMOL/L — HIGH (ref 96–108)
CHLORIDE SERPL-SCNC: 102 MMOL/L — SIGNIFICANT CHANGE UP (ref 96–108)
CHLORIDE SERPL-SCNC: 104 MMOL/L — SIGNIFICANT CHANGE UP (ref 96–108)
CHLORIDE SERPL-SCNC: 107 MMOL/L — SIGNIFICANT CHANGE UP (ref 96–108)
CK MB CFR SERPL CALC: 12.9 NG/ML — HIGH (ref 0–6.7)
CK SERPL-CCNC: 151 U/L — SIGNIFICANT CHANGE UP (ref 25–170)
CO2 SERPL-SCNC: 13 MMOL/L — LOW (ref 22–29)
CO2 SERPL-SCNC: 9 MMOL/L — CRITICAL LOW (ref 22–29)
CO2 SERPL-SCNC: <6 MMOL/L — CRITICAL LOW (ref 22–29)
CREAT SERPL-MCNC: 6.29 MG/DL — HIGH (ref 0.5–1.3)
CREAT SERPL-MCNC: 6.39 MG/DL — HIGH (ref 0.5–1.3)
CREAT SERPL-MCNC: 7.07 MG/DL — HIGH (ref 0.5–1.3)
EGFR: 5 ML/MIN/1.73M2 — LOW
EGFR: 6 ML/MIN/1.73M2 — LOW
EGFR: 6 ML/MIN/1.73M2 — LOW
EOSINOPHIL # BLD AUTO: 0 K/UL — SIGNIFICANT CHANGE UP (ref 0–0.5)
EOSINOPHIL # BLD AUTO: 0.02 K/UL — SIGNIFICANT CHANGE UP (ref 0–0.5)
EOSINOPHIL NFR BLD AUTO: 0 % — SIGNIFICANT CHANGE UP (ref 0–6)
EOSINOPHIL NFR BLD AUTO: 0.3 % — SIGNIFICANT CHANGE UP (ref 0–6)
GAS PNL BLDA: SIGNIFICANT CHANGE UP
GAS PNL BLDV: 134 MMOL/L — LOW (ref 136–145)
GAS PNL BLDV: SIGNIFICANT CHANGE UP
GIANT PLATELETS BLD QL SMEAR: PRESENT — SIGNIFICANT CHANGE UP
GLUCOSE BLDV-MCNC: 220 MG/DL — HIGH (ref 70–99)
GLUCOSE SERPL-MCNC: 214 MG/DL — HIGH (ref 70–99)
GLUCOSE SERPL-MCNC: 356 MG/DL — HIGH (ref 70–99)
GLUCOSE SERPL-MCNC: 396 MG/DL — HIGH (ref 70–99)
HCO3 BLDV-SCNC: 4 MMOL/L — CRITICAL LOW (ref 22–29)
HCT VFR BLD CALC: 23.6 % — LOW (ref 34.5–45)
HCT VFR BLD CALC: 27.8 % — LOW (ref 34.5–45)
HCT VFR BLD CALC: 30.4 % — LOW (ref 34.5–45)
HCT VFR BLDA CALC: 21 % — SIGNIFICANT CHANGE UP
HGB BLD CALC-MCNC: 7.1 G/DL — LOW (ref 11.7–16.1)
HGB BLD-MCNC: 6.6 G/DL — CRITICAL LOW (ref 11.5–15.5)
HGB BLD-MCNC: 8.8 G/DL — LOW (ref 11.5–15.5)
HGB BLD-MCNC: 9.4 G/DL — LOW (ref 11.5–15.5)
IMM GRANULOCYTES NFR BLD AUTO: 1.2 % — HIGH (ref 0–0.9)
INR BLD: 1.13 RATIO — SIGNIFICANT CHANGE UP (ref 0.88–1.16)
LACTATE BLDV-MCNC: 9.2 MMOL/L — CRITICAL HIGH (ref 0.5–2)
LACTATE SERPL-SCNC: 9 MMOL/L — CRITICAL HIGH (ref 0.5–2)
LYMPHOCYTES # BLD AUTO: 0.36 K/UL — LOW (ref 1–3.3)
LYMPHOCYTES # BLD AUTO: 2.88 K/UL — SIGNIFICANT CHANGE UP (ref 1–3.3)
LYMPHOCYTES # BLD AUTO: 38.9 % — SIGNIFICANT CHANGE UP (ref 13–44)
LYMPHOCYTES # BLD AUTO: 6.1 % — LOW (ref 13–44)
MACROCYTES BLD QL: SIGNIFICANT CHANGE UP
MACROCYTES BLD QL: SLIGHT — SIGNIFICANT CHANGE UP
MAGNESIUM SERPL-MCNC: 2.4 MG/DL — SIGNIFICANT CHANGE UP (ref 1.6–2.6)
MAGNESIUM SERPL-MCNC: 2.5 MG/DL — SIGNIFICANT CHANGE UP (ref 1.6–2.6)
MAGNESIUM SERPL-MCNC: 2.5 MG/DL — SIGNIFICANT CHANGE UP (ref 1.6–2.6)
MANUAL SMEAR VERIFICATION: SIGNIFICANT CHANGE UP
MANUAL SMEAR VERIFICATION: SIGNIFICANT CHANGE UP
MCHC RBC-ENTMCNC: 27.7 GM/DL — LOW (ref 32–36)
MCHC RBC-ENTMCNC: 30.9 GM/DL — LOW (ref 32–36)
MCHC RBC-ENTMCNC: 31.5 PG — SIGNIFICANT CHANGE UP (ref 27–34)
MCHC RBC-ENTMCNC: 31.5 PG — SIGNIFICANT CHANGE UP (ref 27–34)
MCHC RBC-ENTMCNC: 31.7 GM/DL — LOW (ref 32–36)
MCHC RBC-ENTMCNC: 32.8 PG — SIGNIFICANT CHANGE UP (ref 27–34)
MCV RBC AUTO: 102 FL — HIGH (ref 80–100)
MCV RBC AUTO: 118.7 FL — HIGH (ref 80–100)
MCV RBC AUTO: 99.6 FL — SIGNIFICANT CHANGE UP (ref 80–100)
METAMYELOCYTES # FLD: 2.6 % — HIGH (ref 0–0)
MONOCYTES # BLD AUTO: 0.15 K/UL — SIGNIFICANT CHANGE UP (ref 0–0.9)
MONOCYTES # BLD AUTO: 0.56 K/UL — SIGNIFICANT CHANGE UP (ref 0–0.9)
MONOCYTES NFR BLD AUTO: 2.6 % — SIGNIFICANT CHANGE UP (ref 2–14)
MONOCYTES NFR BLD AUTO: 7.6 % — SIGNIFICANT CHANGE UP (ref 2–14)
NEUTROPHILS # BLD AUTO: 3.84 K/UL — SIGNIFICANT CHANGE UP (ref 1.8–7.4)
NEUTROPHILS # BLD AUTO: 5.25 K/UL — SIGNIFICANT CHANGE UP (ref 1.8–7.4)
NEUTROPHILS NFR BLD AUTO: 51.7 % — SIGNIFICANT CHANGE UP (ref 43–77)
NEUTROPHILS NFR BLD AUTO: 84.4 % — HIGH (ref 43–77)
NEUTS BAND # BLD: 4.3 % — SIGNIFICANT CHANGE UP (ref 0–8)
NRBC # BLD: 1 /100 WBCS — HIGH (ref 0–0)
NRBC # BLD: 4 /100 WBCS — HIGH (ref 0–0)
NRBC # BLD: 4 /100 — HIGH (ref 0–0)
NT-PROBNP SERPL-SCNC: 5580 PG/ML — HIGH (ref 0–300)
PCO2 BLDV: 21 MMHG — LOW (ref 39–42)
PH BLDV: <6.942 — CRITICAL LOW (ref 7.32–7.43)
PHOSPHATE SERPL-MCNC: 3.5 MG/DL — SIGNIFICANT CHANGE UP (ref 2.4–4.7)
PHOSPHATE SERPL-MCNC: 3.6 MG/DL — SIGNIFICANT CHANGE UP (ref 2.4–4.7)
PLAT MORPH BLD: NORMAL — SIGNIFICANT CHANGE UP
PLAT MORPH BLD: NORMAL — SIGNIFICANT CHANGE UP
PLATELET # BLD AUTO: 134 K/UL — LOW (ref 150–400)
PLATELET # BLD AUTO: 144 K/UL — LOW (ref 150–400)
PLATELET # BLD AUTO: 148 K/UL — LOW (ref 150–400)
PO2 BLDV: 160 MMHG — HIGH (ref 25–45)
POIKILOCYTOSIS BLD QL AUTO: SLIGHT — SIGNIFICANT CHANGE UP
POLYCHROMASIA BLD QL SMEAR: SLIGHT — SIGNIFICANT CHANGE UP
POTASSIUM BLDV-SCNC: 8.1 MMOL/L — CRITICAL HIGH (ref 3.5–5.1)
POTASSIUM SERPL-MCNC: 6.3 MMOL/L — CRITICAL HIGH (ref 3.5–5.3)
POTASSIUM SERPL-MCNC: 6.7 MMOL/L — CRITICAL HIGH (ref 3.5–5.3)
POTASSIUM SERPL-MCNC: 7.9 MMOL/L — CRITICAL HIGH (ref 3.5–5.3)
POTASSIUM SERPL-SCNC: 6.3 MMOL/L — CRITICAL HIGH (ref 3.5–5.3)
POTASSIUM SERPL-SCNC: 6.7 MMOL/L — CRITICAL HIGH (ref 3.5–5.3)
POTASSIUM SERPL-SCNC: 7.9 MMOL/L — CRITICAL HIGH (ref 3.5–5.3)
PROT SERPL-MCNC: 5.7 G/DL — LOW (ref 6.6–8.7)
PROT SERPL-MCNC: 6.2 G/DL — LOW (ref 6.6–8.7)
PROTHROM AB SERPL-ACNC: 13.1 SEC — SIGNIFICANT CHANGE UP (ref 10.5–13.4)
RBC # BLD: 1.98 M/UL — LOW (ref 3.8–5.2)
RBC # BLD: 2.79 M/UL — LOW (ref 3.8–5.2)
RBC # BLD: 2.98 M/UL — LOW (ref 3.8–5.2)
RBC # FLD: 13.6 % — SIGNIFICANT CHANGE UP (ref 10.3–14.5)
RBC # FLD: 15.9 % — HIGH (ref 10.3–14.5)
RBC # FLD: 16.7 % — HIGH (ref 10.3–14.5)
RBC BLD AUTO: ABNORMAL
RBC BLD AUTO: SIGNIFICANT CHANGE UP
SAO2 % BLDV: 98.8 % — SIGNIFICANT CHANGE UP
SARS-COV-2 RNA SPEC QL NAA+PROBE: SIGNIFICANT CHANGE UP
SODIUM SERPL-SCNC: 138 MMOL/L — SIGNIFICANT CHANGE UP (ref 135–145)
SODIUM SERPL-SCNC: 143 MMOL/L — SIGNIFICANT CHANGE UP (ref 135–145)
SODIUM SERPL-SCNC: 143 MMOL/L — SIGNIFICANT CHANGE UP (ref 135–145)
TROPONIN T SERPL-MCNC: 0.09 NG/ML — HIGH (ref 0–0.06)
TSH SERPL-MCNC: 6.15 UIU/ML — HIGH (ref 0.27–4.2)
WBC # BLD: 2.92 K/UL — LOW (ref 3.8–10.5)
WBC # BLD: 5.92 K/UL — SIGNIFICANT CHANGE UP (ref 3.8–10.5)
WBC # BLD: 7.41 K/UL — SIGNIFICANT CHANGE UP (ref 3.8–10.5)
WBC # FLD AUTO: 2.92 K/UL — LOW (ref 3.8–10.5)
WBC # FLD AUTO: 5.92 K/UL — SIGNIFICANT CHANGE UP (ref 3.8–10.5)
WBC # FLD AUTO: 7.41 K/UL — SIGNIFICANT CHANGE UP (ref 3.8–10.5)

## 2023-01-01 PROCEDURE — 93010 ELECTROCARDIOGRAM REPORT: CPT | Mod: 77

## 2023-01-01 PROCEDURE — 36680 INSERT NEEDLE BONE CAVITY: CPT

## 2023-01-01 PROCEDURE — 99291 CRITICAL CARE FIRST HOUR: CPT | Mod: 25

## 2023-01-01 PROCEDURE — 93010 ELECTROCARDIOGRAM REPORT: CPT

## 2023-01-01 PROCEDURE — 71045 X-RAY EXAM CHEST 1 VIEW: CPT | Mod: 26

## 2023-01-01 PROCEDURE — 36556 INSERT NON-TUNNEL CV CATH: CPT

## 2023-01-01 PROCEDURE — 99223 1ST HOSP IP/OBS HIGH 75: CPT

## 2023-01-01 PROCEDURE — 70450 CT HEAD/BRAIN W/O DYE: CPT | Mod: 26

## 2023-01-01 PROCEDURE — 71250 CT THORAX DX C-: CPT | Mod: 26

## 2023-01-01 PROCEDURE — 31500 INSERT EMERGENCY AIRWAY: CPT

## 2023-01-01 PROCEDURE — 99497 ADVNCD CARE PLAN 30 MIN: CPT | Mod: 25

## 2023-01-01 PROCEDURE — 92950 HEART/LUNG RESUSCITATION CPR: CPT

## 2023-01-01 PROCEDURE — 74176 CT ABD & PELVIS W/O CONTRAST: CPT | Mod: 26

## 2023-01-01 RX ORDER — METFORMIN HYDROCHLORIDE 850 MG/1
1 TABLET ORAL
Qty: 0 | Refills: 0 | DISCHARGE

## 2023-01-01 RX ORDER — HYDROCORTISONE 20 MG
100 TABLET ORAL ONCE
Refills: 0 | Status: COMPLETED | OUTPATIENT
Start: 2023-01-01 | End: 2023-01-01

## 2023-01-01 RX ORDER — ALBUTEROL 90 UG/1
10 AEROSOL, METERED ORAL ONCE
Refills: 0 | Status: COMPLETED | OUTPATIENT
Start: 2023-01-01 | End: 2023-01-01

## 2023-01-01 RX ORDER — FENTANYL CITRATE 50 UG/ML
0.5 INJECTION INTRAVENOUS
Qty: 2500 | Refills: 0 | Status: DISCONTINUED | OUTPATIENT
Start: 2023-01-01 | End: 2023-01-01

## 2023-01-01 RX ORDER — FENTANYL CITRATE 50 UG/ML
50 INJECTION INTRAVENOUS ONCE
Refills: 0 | Status: DISCONTINUED | OUTPATIENT
Start: 2023-01-01 | End: 2023-01-01

## 2023-01-01 RX ORDER — FENTANYL CITRATE 50 UG/ML
50 INJECTION INTRAVENOUS EVERY 4 HOURS
Refills: 0 | Status: DISCONTINUED | OUTPATIENT
Start: 2023-01-01 | End: 2023-01-01

## 2023-01-01 RX ORDER — HYDROMORPHONE HYDROCHLORIDE 2 MG/ML
2 INJECTION INTRAMUSCULAR; INTRAVENOUS; SUBCUTANEOUS
Refills: 0 | Status: DISCONTINUED | OUTPATIENT
Start: 2023-01-01 | End: 2023-01-01

## 2023-01-01 RX ORDER — ATORVASTATIN CALCIUM 80 MG/1
1 TABLET, FILM COATED ORAL
Qty: 0 | Refills: 0 | DISCHARGE

## 2023-01-01 RX ORDER — HYDROMORPHONE HYDROCHLORIDE 2 MG/ML
1 INJECTION INTRAMUSCULAR; INTRAVENOUS; SUBCUTANEOUS ONCE
Refills: 0 | Status: DISCONTINUED | OUTPATIENT
Start: 2023-01-01 | End: 2023-01-01

## 2023-01-01 RX ORDER — SODIUM CHLORIDE 9 MG/ML
1000 INJECTION INTRAMUSCULAR; INTRAVENOUS; SUBCUTANEOUS ONCE
Refills: 0 | Status: COMPLETED | OUTPATIENT
Start: 2023-01-01 | End: 2023-01-01

## 2023-01-01 RX ORDER — CHLORHEXIDINE GLUCONATE 213 G/1000ML
1 SOLUTION TOPICAL
Refills: 0 | Status: DISCONTINUED | OUTPATIENT
Start: 2023-01-01 | End: 2023-01-01

## 2023-01-01 RX ORDER — TRAZODONE HCL 50 MG
1 TABLET ORAL
Qty: 0 | Refills: 0 | DISCHARGE

## 2023-01-01 RX ORDER — SODIUM BICARBONATE 1 MEQ/ML
50 SYRINGE (ML) INTRAVENOUS ONCE
Refills: 0 | Status: COMPLETED | OUTPATIENT
Start: 2023-01-01 | End: 2023-01-01

## 2023-01-01 RX ORDER — SODIUM BICARBONATE 1 MEQ/ML
150 SYRINGE (ML) INTRAVENOUS ONCE
Refills: 0 | Status: COMPLETED | OUTPATIENT
Start: 2023-01-01 | End: 2023-01-01

## 2023-01-01 RX ORDER — NIFEDIPINE 30 MG
1 TABLET, EXTENDED RELEASE 24 HR ORAL
Qty: 0 | Refills: 0 | DISCHARGE

## 2023-01-01 RX ORDER — VASOPRESSIN 20 [USP'U]/ML
0.04 INJECTION INTRAVENOUS
Qty: 40 | Refills: 0 | Status: DISCONTINUED | OUTPATIENT
Start: 2023-01-01 | End: 2023-01-01

## 2023-01-01 RX ORDER — CHLORHEXIDINE GLUCONATE 213 G/1000ML
15 SOLUTION TOPICAL EVERY 12 HOURS
Refills: 0 | Status: DISCONTINUED | OUTPATIENT
Start: 2023-01-01 | End: 2023-01-01

## 2023-01-01 RX ORDER — HYDROCORTISONE 20 MG
50 TABLET ORAL EVERY 6 HOURS
Refills: 0 | Status: DISCONTINUED | OUTPATIENT
Start: 2023-01-01 | End: 2023-01-01

## 2023-01-01 RX ORDER — CALCIUM CHLORIDE
1000 POWDER (GRAM) MISCELLANEOUS ONCE
Refills: 0 | Status: COMPLETED | OUTPATIENT
Start: 2023-01-01 | End: 2023-01-01

## 2023-01-01 RX ORDER — DEXTROSE 50 % IN WATER 50 %
50 SYRINGE (ML) INTRAVENOUS ONCE
Refills: 0 | Status: COMPLETED | OUTPATIENT
Start: 2023-01-01 | End: 2023-01-01

## 2023-01-01 RX ORDER — RAMIPRIL 5 MG
1 CAPSULE ORAL
Qty: 0 | Refills: 0 | DISCHARGE

## 2023-01-01 RX ORDER — VANCOMYCIN HCL 1 G
500 VIAL (EA) INTRAVENOUS ONCE
Refills: 0 | Status: COMPLETED | OUTPATIENT
Start: 2023-01-01 | End: 2023-01-01

## 2023-01-01 RX ORDER — HYDROMORPHONE HYDROCHLORIDE 2 MG/ML
2 INJECTION INTRAMUSCULAR; INTRAVENOUS; SUBCUTANEOUS
Qty: 100 | Refills: 0 | Status: DISCONTINUED | OUTPATIENT
Start: 2023-01-01 | End: 2023-01-01

## 2023-01-01 RX ORDER — ESCITALOPRAM OXALATE 10 MG/1
1 TABLET, FILM COATED ORAL
Qty: 0 | Refills: 0 | DISCHARGE

## 2023-01-01 RX ORDER — ROBINUL 0.2 MG/ML
0.2 INJECTION INTRAMUSCULAR; INTRAVENOUS EVERY 6 HOURS
Refills: 0 | Status: DISCONTINUED | OUTPATIENT
Start: 2023-01-01 | End: 2023-01-01

## 2023-01-01 RX ORDER — MAGNESIUM SULFATE 500 MG/ML
2 VIAL (ML) INJECTION ONCE
Refills: 0 | Status: COMPLETED | OUTPATIENT
Start: 2023-01-01 | End: 2023-01-01

## 2023-01-01 RX ORDER — SODIUM BICARBONATE 1 MEQ/ML
0.5 SYRINGE (ML) INTRAVENOUS
Qty: 150 | Refills: 0 | Status: DISCONTINUED | OUTPATIENT
Start: 2023-01-01 | End: 2023-01-01

## 2023-01-01 RX ORDER — PANTOPRAZOLE SODIUM 20 MG/1
40 TABLET, DELAYED RELEASE ORAL DAILY
Refills: 0 | Status: DISCONTINUED | OUTPATIENT
Start: 2023-01-01 | End: 2023-01-01

## 2023-01-01 RX ORDER — PANTOPRAZOLE SODIUM 20 MG/1
80 TABLET, DELAYED RELEASE ORAL ONCE
Refills: 0 | Status: COMPLETED | OUTPATIENT
Start: 2023-01-01 | End: 2023-01-01

## 2023-01-01 RX ORDER — PIPERACILLIN AND TAZOBACTAM 4; .5 G/20ML; G/20ML
3.38 INJECTION, POWDER, LYOPHILIZED, FOR SOLUTION INTRAVENOUS EVERY 12 HOURS
Refills: 0 | Status: DISCONTINUED | OUTPATIENT
Start: 2023-01-01 | End: 2023-01-01

## 2023-01-01 RX ORDER — NOREPINEPHRINE BITARTRATE/D5W 8 MG/250ML
0.3 PLASTIC BAG, INJECTION (ML) INTRAVENOUS
Qty: 8 | Refills: 0 | Status: DISCONTINUED | OUTPATIENT
Start: 2023-01-01 | End: 2023-01-01

## 2023-01-01 RX ORDER — METOPROLOL TARTRATE 50 MG
1 TABLET ORAL
Qty: 0 | Refills: 0 | DISCHARGE

## 2023-01-01 RX ORDER — INSULIN HUMAN 100 [IU]/ML
10 INJECTION, SOLUTION SUBCUTANEOUS ONCE
Refills: 0 | Status: COMPLETED | OUTPATIENT
Start: 2023-01-01 | End: 2023-01-01

## 2023-01-01 RX ORDER — PIPERACILLIN AND TAZOBACTAM 4; .5 G/20ML; G/20ML
3.38 INJECTION, POWDER, LYOPHILIZED, FOR SOLUTION INTRAVENOUS ONCE
Refills: 0 | Status: COMPLETED | OUTPATIENT
Start: 2023-01-01 | End: 2023-01-01

## 2023-01-01 RX ADMIN — PIPERACILLIN AND TAZOBACTAM 25 GRAM(S): 4; .5 INJECTION, POWDER, LYOPHILIZED, FOR SOLUTION INTRAVENOUS at 06:52

## 2023-01-01 RX ADMIN — Medication 25.3 MICROGRAM(S)/KG/MIN: at 02:24

## 2023-01-01 RX ADMIN — FENTANYL CITRATE 2.27 MICROGRAM(S)/KG/HR: 50 INJECTION INTRAVENOUS at 17:40

## 2023-01-01 RX ADMIN — CHLORHEXIDINE GLUCONATE 15 MILLILITER(S): 213 SOLUTION TOPICAL at 06:54

## 2023-01-01 RX ADMIN — Medication 100 MILLIGRAM(S): at 22:02

## 2023-01-01 RX ADMIN — PANTOPRAZOLE SODIUM 80 MILLIGRAM(S): 20 TABLET, DELAYED RELEASE ORAL at 21:03

## 2023-01-01 RX ADMIN — ALBUTEROL 10 MILLIGRAM(S): 90 AEROSOL, METERED ORAL at 16:30

## 2023-01-01 RX ADMIN — FENTANYL CITRATE 50 MICROGRAM(S): 50 INJECTION INTRAVENOUS at 17:40

## 2023-01-01 RX ADMIN — FENTANYL CITRATE 50 MICROGRAM(S): 50 INJECTION INTRAVENOUS at 16:35

## 2023-01-01 RX ADMIN — CHLORHEXIDINE GLUCONATE 1 APPLICATION(S): 213 SOLUTION TOPICAL at 21:13

## 2023-01-01 RX ADMIN — Medication 1000 MILLIGRAM(S): at 16:30

## 2023-01-01 RX ADMIN — Medication 150 MILLIEQUIVALENT(S): at 19:09

## 2023-01-01 RX ADMIN — Medication 25.3 MICROGRAM(S)/KG/MIN: at 20:22

## 2023-01-01 RX ADMIN — Medication 50 MILLIGRAM(S): at 21:06

## 2023-01-01 RX ADMIN — Medication 50 MILLILITER(S): at 17:44

## 2023-01-01 RX ADMIN — Medication 50 MILLIGRAM(S): at 01:46

## 2023-01-01 RX ADMIN — Medication 150 MEQ/KG/HR: at 02:24

## 2023-01-01 RX ADMIN — Medication 150 MEQ/KG/HR: at 20:17

## 2023-01-01 RX ADMIN — Medication 50 MILLIGRAM(S): at 06:53

## 2023-01-01 RX ADMIN — PIPERACILLIN AND TAZOBACTAM 3.38 GRAM(S): 4; .5 INJECTION, POWDER, LYOPHILIZED, FOR SOLUTION INTRAVENOUS at 17:17

## 2023-01-01 RX ADMIN — Medication 150 GRAM(S): at 16:25

## 2023-01-01 RX ADMIN — SODIUM CHLORIDE 1000 MILLILITER(S): 9 INJECTION INTRAMUSCULAR; INTRAVENOUS; SUBCUTANEOUS at 17:10

## 2023-01-01 RX ADMIN — Medication 100 MILLIGRAM(S): at 16:40

## 2023-01-01 RX ADMIN — FENTANYL CITRATE 50 MICROGRAM(S): 50 INJECTION INTRAVENOUS at 17:05

## 2023-01-01 RX ADMIN — Medication 2 MILLIGRAM(S): at 13:24

## 2023-01-01 RX ADMIN — FENTANYL CITRATE 50 MICROGRAM(S): 50 INJECTION INTRAVENOUS at 10:26

## 2023-01-01 RX ADMIN — SODIUM CHLORIDE 1000 MILLILITER(S): 9 INJECTION INTRAMUSCULAR; INTRAVENOUS; SUBCUTANEOUS at 17:15

## 2023-01-01 RX ADMIN — Medication 2 GRAM(S): at 16:45

## 2023-01-01 RX ADMIN — PIPERACILLIN AND TAZOBACTAM 25 GRAM(S): 4; .5 INJECTION, POWDER, LYOPHILIZED, FOR SOLUTION INTRAVENOUS at 21:06

## 2023-01-01 RX ADMIN — HYDROMORPHONE HYDROCHLORIDE 2 MG/HR: 2 INJECTION INTRAMUSCULAR; INTRAVENOUS; SUBCUTANEOUS at 12:29

## 2023-01-01 RX ADMIN — HYDROMORPHONE HYDROCHLORIDE 1 MILLIGRAM(S): 2 INJECTION INTRAMUSCULAR; INTRAVENOUS; SUBCUTANEOUS at 13:17

## 2023-01-01 RX ADMIN — FENTANYL CITRATE 50 MICROGRAM(S): 50 INJECTION INTRAVENOUS at 10:41

## 2023-01-01 RX ADMIN — PIPERACILLIN AND TAZOBACTAM 200 GRAM(S): 4; .5 INJECTION, POWDER, LYOPHILIZED, FOR SOLUTION INTRAVENOUS at 17:45

## 2023-01-01 RX ADMIN — CHLORHEXIDINE GLUCONATE 1 APPLICATION(S): 213 SOLUTION TOPICAL at 06:54

## 2023-01-01 RX ADMIN — Medication 50 MILLIEQUIVALENT(S): at 16:40

## 2023-01-01 RX ADMIN — CHLORHEXIDINE GLUCONATE 15 MILLILITER(S): 213 SOLUTION TOPICAL at 21:05

## 2023-01-01 RX ADMIN — INSULIN HUMAN 10 UNIT(S): 100 INJECTION, SOLUTION SUBCUTANEOUS at 17:43

## 2023-01-01 RX ADMIN — Medication 25.3 MICROGRAM(S)/KG/MIN: at 17:11

## 2023-01-23 NOTE — ED ADULT TRIAGE NOTE - MODE OF ARRIVAL
Give him Holter x 3 days.  
Patient is calling asking to speak to the nurse. Patient states that ever since having radiation for his tongue he has been sweating, having headaches, and can feel his heart racing. Patient would like a call back at   Telephone Information:   Mobile [phone]   Mobile [phone]        
Results and recommendations per Dr. Thomas relayed to patient. All questions answered, patient verbalizes understanding. Instructed to call with any further questions.     Forwarded to [facility] for scheduling.    
Returned call to [place]. In regards to the headaches, he has been noticing a headache since he has been back at work and having to go outside. It correlates with being outside, improved when he began wearing a hat.     Starting on Friday he started noticing sweating and heart racing with exertion.    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 The symptoms improved with rest within 15-20 minutes.   Has not had any repeat episodes but has not exerted himself.   Denies chest pain, just feels the heart racing. Denies shortness of breath.      Will review with Dr. Thomas.   
Private Vehicle

## 2023-04-06 NOTE — ED PROVIDER NOTE - OBJECTIVE STATEMENT
87y Female bedbound with history of HTN, DM, dementia BIBEMS found in respiratory arrest and noted to be pulseless by EMS. BLS CPR initiated, no medications given. No further history available.

## 2023-04-06 NOTE — H&P ADULT - NS PANP COMMENT GEN_ALL_CORE FT
87-year-old  female with essential hypertension dyslipidemia diabetes mellitus dementia with progressively worsening muscle atrophy and bilateral lower extremity with worsening debilitated state as well as bilateral blindness and periodic agitation who was most likely in her usual state of health up until yesterday who was found to have lethargy/altered mental status minimal coughing and respiratory distress by the caregiver earlier today with bradycardia and hypotension in 40s and systolic 90s respectively EMS called during the ride she was awake but then had sudden change in respiratory status with agonal breathing with succumbed into cardiac arrest requiring CPR which continued through the ER course where she was intubated placed on mechanical ventilator for total of 8 to 10 minutes with 3 A of epi, 1 amp of calcium as well as 1 amp of bicarbonate and post ROSC she received 1 amp of bicarb as well as calcium chloride along with transcellular agents including but not limited to insulin dextrose albuterol for hyperkalemia.  Patient was started on IV vasoactive agents and also fentanyl for respiratory distress.  Unequal pupil not significant but present.  Still patient currently has PEA cardiac arrest with acute hypoxic hypercapnic respiratory failure along with acute kidney injury severe metabolic acidosis/hyperkalemia with hypoxic/anoxic and metabolic encephalopathy of unclear etiology along with macrocytic anemia without active bleeding ruling out infection/bleeding at this point.    CT head, chest abdomen pelvis without contrast  Every 4 neurochecks  We will continue with fentanyl infusion for now and SAT in the morning  Volume assist-control, further adjustment in the vent settings based on next ABG, chest x-ray postintubation  Empiric vancomycin 1 dose and  renally adjusted Zosyn while awaiting blood and urine culture as well as pan scan  Every 6 fingerstick  We will transfuse 1 unit of PRBC  Map goal more than 65, will obtain TTE, trend lactic acid, continue with IV Levophed through central venous access but if significantly worsening vasoactive  agents requirements,   the plan would be to call healthcare proxy/cousin Madina to readdress goals of care even if she is an urgent hemodialysis requirement situation where she is inclined towards changing the goals of care to comfort measures only as she thinks that patient has been declining over time and she would not want these interventions but for now she is DNR     all questions answered plan discussed with medical ICU team, ER team and the family at bedside

## 2023-04-06 NOTE — PHARMACOTHERAPY INTERVENTION NOTE - COMMENTS
Called pharmacy to obtain medication list. Outpatient Medication Review updated.    HOME MEDICATIONS:  atorvastatin 10 mg oral tablet: 1 tab(s) orally once a day (06 Apr 2023 19:46)  escitalopram 20 mg oral tablet: 1 tab(s) orally once a day (06 Apr 2023 19:46)  metFORMIN 1000 mg oral tablet: 1 tab(s) orally 2 times a day (06 Apr 2023 19:46)  metoprolol succinate 50 mg oral tablet, extended release: 1 tab(s) orally once a day (06 Apr 2023 19:46)  NIFEdipine 60 mg oral tablet, extended release: 1 tab(s) orally once a day (06 Apr 2023 19:46)  ramipril 10 mg oral capsule: 1 cap(s) orally once a day (06 Apr 2023 19:46)

## 2023-04-06 NOTE — ED PROCEDURE NOTE - ATTENDING CONTRIBUTION TO CARE
I supervised the key portions of the procedure and agree with resident's note as written.

## 2023-04-06 NOTE — INITIAL ORGAN DONATION REFERRAL - NSORGANDONATIONCLINICALTRIGGER_GEN_ALL_CORE
[place] Coma Scale is less than or equal to 5/Family discussion withdrawal of life-sustaining therapies is anticipated

## 2023-04-06 NOTE — ED PROVIDER NOTE - ATTENDING CONTRIBUTION TO CARE
I have personally provided _90__ minutes of critical care time exclusive of time spent on separately billable procedures. Time includes review of laboratory data, radiology results, discussion with consultants, and monitoring for potential decompensation. Interventions were performed as documented above    elderly female with hx of dementia, as per family, declining more and more over several months, now essentially non verbal with declining PO intake; today EMS activated due to "guppy breathing" and decline in mental status; sustained cardio-respiratory arrest while in ambulance, no shocks; ROSC obtained in ED with2 rounds of cpr/epi, no shock delivered; intubated without incident; ecg notable widened, suggestive of hyperK; treated accordingly; IVF resuscitation and vasopressor therapy initiated as well; admit to MICU; discussed with family, MOLST completed, will make DNR - if patient should arrest again, family does not wish to proceed with further heroic or invasive measures.     I personally saw the patient with the resident, and completed the key components of the history and physical exam. I then discussed the management plan with the resident.

## 2023-04-06 NOTE — ED PROVIDER NOTE - PHYSICAL EXAMINATION
Constitutional: Patient unresponsive, no purposeful movements. CPR in progress.  HENT: NC/AT  Eyes: Pupils fixed  Neck: Trachea midline.  Cardiac: No cardiac heart tones. No palpable pulse. CPR in progress.  Resp: BVM respirations. No spontaneous respirations.  Abd: soft, non-distended.  Extremities: Cool and mottled.  Skin: Pale, cool to touch.  Neuro: Patient unresponsive, no spontaneous movements. Does not withdraw to pain. No gag reflex. No corneal reflex.

## 2023-04-06 NOTE — ED ADULT NURSE NOTE - OBJECTIVE STATEMENT
as per EMS pt having worsening difficulty breathing today, and was awake when they arrived and in route to hospital pt became unresponsive with agonal breathing and then became a cardiac arrest , pt arrived to ED with CPR in progress pt has hx dementia and is nonverbal at baseline, ... see code sheet,

## 2023-04-06 NOTE — ED PROVIDER NOTE - CLINICAL SUMMARY MEDICAL DECISION MAKING FREE TEXT BOX
87y Female presenting in cardiac arrest. ACLS initiated on arrival, calcium, bicarbonate, epinephrine, dextrose given with ROSC. No shocks given. ECG with concern for hyperkalemia. Patient intubated and central venous access obtained. Vasopressor started for hemodynamic stability. Mitchell hugger applied for hypothermia. No signs of traumatic injuries. Differential diagnosis includes but not limited to cardiogenic shock secondary to cardiac arrest, MEREDITH, electrolyte derangement, infectious process. Will admit to MICU

## 2023-04-06 NOTE — H&P ADULT - HISTORY OF PRESENT ILLNESS
Pt is an 86 y/o F pmhx of HTN, HLD, DM, and dementia who was BIBEMS for HELMS/respiratory distress. Pt experienced cardiac arrest upon presentation to ED, Intubated for hypoxia and underwent  2 rounds of CPR w/ 2 EPI's given w/ ROSC. Pt started on levophed for HD support during shock state, Initial ABG shows severe acidosis, w/ elevated potassium, treated w/ insulin and calcium. As per Family at bedside pt had been lethargic yesterday and experienced difficulty w/ her food at dinner, then today began working harder to breath which prompted them to call EMS. Pt made DNR by family. ICU consulted for further management.

## 2023-04-06 NOTE — ED PROCEDURE NOTE - CPROC ED INFUS LINE DETAIL1
Ultrasound guidance was used./The guidewire was recovered./All lumen(s) aspirated and flushed without difficulty.

## 2023-04-06 NOTE — H&P ADULT - NSHPPHYSICALEXAM_GEN_ALL_CORE
T(C): 31.1 (04-06-23 @ 17:50), Max: 31.1 (04-06-23 @ 17:50)  HR: 69 (04-06-23 @ 17:50) (56 - 73)  BP: 96/49 (04-06-23 @ 17:50) (87/48 - 109/53)  RR: 24 (04-06-23 @ 17:50) (24 - 28)  SpO2: 100% (04-06-23 @ 17:50) (100% - 100%)    CONSTITUTIONAL: Pt laying in hospital bed in no acute distress, sedated on vent.   HEENT: Pupils sluggish to light L>R.   RESP: No respiratory distress, no use of accessory muscles; CTA b/l, no WRR  CV: RRR, +S1S2, no MRG; no JVD; no peripheral edema  GI: Soft, NT, ND, no rebound, no guarding; no palpable masses; no hepatosplenomegaly; no hernia palpated  MSK: Nontender, nonedematous   SKIN: Cool and dry   NEURO: Sedated on fentanyl

## 2023-04-06 NOTE — H&P ADULT - ASSESSMENT
Pt is an 86 y/o F pmhx of HTN, HLD, DM, and dementia who was BIBEMS for HELMS/respiratory distress. Pt experienced cardiac arrest upon presentation to ED, Intubated for hypoxia and underwent  2 rounds of CPR w/ 2 EPI's given w/ ROSC. Pt started on levophed for HD support during shock state, Initial ABG shows severe acidosis, w/ elevated potassium, treated w/ insulin and calcium. As per Family at bedside pt had been lethargic yesterday and experienced difficulty w/ her food at dinner, then today began working harder to breath which prompted them to call EMS. Pt made DNR by family.    1. Cardiac arrest   2. Severe metabolic Acidosis   3. Hyperkalemia   4. Acute hypoxic respiratory failure   5. Anemia   6. AMS     Plan:   Will admit to MICU for further management and eval.     Neuro: Sedated on vent w/ fent gtt, pupils sluggish and unequal, CTH to evaluate for any underlying pathology.     CV: Shock state, possibly septic? given profound hypothermia started on levophed for HD support, will add vasopressin given profound shock state. Will continue on solucortef for possible adrenal component.      Pulm: Acute hypoxic respiratory failure, possible ASP event, intubated following cardiac arrest, Will use low TV ventilation using 4-6cc/kg of IBW for lung protective strategies, will titrate FiO2 to maintain SpO2>92%.     GI: Diet: NPO, protonix for GI PPX     Renal: Labs pending, monitor Cr. avoid nephrotoxic meds if possible.     Endo: Hyperkalemia following cardiac arrest, treated w/ insulin and calcium, repeat labs to evaluate for responsiveness at 20:00. Glucose <180, Mag>2 for arrythmia suppression.     Heme: Anemia to 6.8 will transfuse 1 PRBC now, hold DVT PPX in setting of anemia, and unknown source of possible bleed.     ID: Suspected septic shock, will continue on Zosyn, cultures sent, will narrow abx as indicated. Trend markers of infection daily.     Case discussed w/ attending Dr. Yousif.

## 2023-04-07 NOTE — CONSULT NOTE ADULT - SUBJECTIVE AND OBJECTIVE BOX
HPI:  Limited/ Unable to obtain secondary to poor mentation/historian  Pt is an 88 y/o F pmhx of HTN, HLD, DM, and dementia who was BIBEMS for HELMS/respiratory distress. Pt experienced cardiac arrest upon presentation to ED, Intubated for hypoxia and underwent  2 rounds of CPR w/ 2 EPI's given w/ ROSC. Pt started on levophed for HD support during shock state, Initial ABG shows severe acidosis, w/ elevated potassium, treated w/ insulin and calcium. As per Family at bedside pt had been lethargic yesterday and experienced difficulty w/ her food at dinner, then today began working harder to breath which prompted them to call EMS. Pt made DNR by family. ICU consulted for further management.  (06 Apr 2023 18:00)      PERTINENT PMH REVIEWED: Yes     PAST MEDICAL & SURGICAL HISTORY:  Diabetes mellitus      HTN (hypertension)      HLD (hyperlipidemia)      Depression      Dementia      No significant past surgical history          SOCIAL HISTORY:  Limited/ Unable to obtain secondary to poor mentation/historian                    Substance history:                    Admitted from:  home                      Pentecostal/spirituality:  [employer]                    Cultural concerns:    Baseline ADLs (prior to admission):   Dependent  - has HHA                      /HCP-  Madina Connell     FAMILY HISTORY:  Limited/ Unable to obtain secondary to poor mentation/historian        Allergies    No Known Allergies    Intolerances        http://npcrc.org/files/[email or web address]_[email or web address]    Present Symptoms:   Dyspnea:  No on vent  Nausea/Vomiting:  No    Anxiety:   No  Depression: Unable  Fatigue: Yes   Loss of appetite:  N/A  NPO  Constipation:  Not reported   Y    Pain:  grimace            Character-            Duration-            Factors-            Severity    Pain AD Score:  [email or web address] (press ctrl + left click to view)    Review of Systems: Reviewed    Unable to obtain due to poor mentation historian      MEDICATIONS  (STANDING):  HYDROmorphone Infusion 2 mG/Hr (2 mL/Hr) IV Continuous <Continuous>  norepinephrine Infusion 0.3 MICROgram(s)/kG/Min (25.3 mL/Hr) IV Continuous <Continuous>    MEDICATIONS  (PRN):  fentaNYL    Injectable 50 MICROGram(s) IV Push every 4 hours PRN Severe Pain (7 - 10)  glycopyrrolate Injectable 0.2 milliGRAM(s) IV Push every 6 hours PRN secretions  LORazepam   Injectable 2 milliGRAM(s) IV Push every 4 hours PRN Agitation      PHYSICAL EXAM:    Vital Signs Last 24 Hrs  T(C): 36.6 (07 Apr 2023 10:00), Max: 37.5 (07 Apr 2023 01:45)  T(F): 97.9 (07 Apr 2023 10:00), Max: 99.5 (07 Apr 2023 01:45)  HR: 88 (07 Apr 2023 10:00) (56 - 124)  BP: 139/53 (07 Apr 2023 10:00) (87/48 - 149/68)  BP(mean): 77 (07 Apr 2023 10:00) (65 - 118)  RR: 15 (07 Apr 2023 10:00) (14 - 28)  SpO2: 99% (07 Apr 2023 10:00) (69% - 100%)    Parameters below as of 07 Apr 2023 10:00  Patient On (Oxygen Delivery Method): ventilator    Karnofsky   10 %  General:  Elderly frail woman NAD intubated  HEENT: NCAT    ( x )  ET tube   (  Lungs: comfortable  CV: RR  GI:  soft NTND  MSK: /bedbound  Neuro: non responsive   Skin: warm dry    LABS:                        8.8    5.92  )-----------( 134      ( 07 Apr 2023 09:15 )             27.8     04-07    143  |  102  |  127.5<H>  ----------------------------<  396<H>  6.7<HH>   |  13.0<L>  |  6.29<H>    Ca    10.2      07 Apr 2023 09:15  Phos  3.5     04-07  Mg     2.4     04-07    TPro  5.7<L>  /  Alb  3.0<L>  /  TBili  0.5  /  DBili  x   /  AST  63<H>  /  ALT  65<H>  /  AlkPhos  80  04-07    PT/INR - ( 06 Apr 2023 16:30 )   PT: 13.1 sec;   INR: 1.13 ratio         PTT - ( 06 Apr 2023 16:30 )  PTT:45.3 sec    I&O's Summary    06 Apr 2023 07:01  -  07 Apr 2023 07:00  --------------------------------------------------------  IN: 1747 mL / OUT: 0 mL / NET: 1747 mL    07 Apr 2023 07:01  -  07 Apr 2023 10:45  --------------------------------------------------------  IN: 336.3 mL / OUT: 0 mL / NET: 336.3 mL        RADIOLOGY & ADDITIONAL STUDIES:  < from: CT Chest No Cont (04.06.23 @ 18:29) >  PROCEDURE:  CT of the Chest, Abdomen and Pelvis was performed.  Sagittal and coronalreformats were performed.    FINDINGS:  CHEST:  LUNGS AND LARGE AIRWAYS: Patent central airways. Endotracheal tube in   appropriate position. Consolidation in the superior segment of the right   lower lobe with additional regions of clustered nodularopacity in both   lower lobes, consistent with pneumonia. Aspiration is a consideration.  PLEURA: No pleural effusion.  VESSELS: Within normal limits.  HEART: Heart size is normal. No pericardial effusion.  MEDIASTINUM AND GEORGE: No lymphadenopathy.  CHEST WALL AND LOWER NECK: Within normal limits.    ABDOMEN AND PELVIS:  LIVER: Within normal limits.  BILE DUCTS: Normal caliber.  GALLBLADDER: Cholelithiasis. Nonspecific gallbladder wall thickening.  SPLEEN: Within normal limits.  PANCREAS: Within normal limits.  ADRENALS: Within normal limits.  KIDNEYS/URETERS: No hydronephrosis. Fluid attenuation lesion in Morison's   pouch measuring 3.0 x 2.2 x 4.0 cm, without definitive association with   the liver or kidney, not significantly changed from prior exam.    BLADDER: Decompressed with a Recio catheter.  REPRODUCTIVE ORGANS: Hysterectomy.    BOWEL: Nasogastric tube terminates in the distal stomach. No bowel   obstruction. Appendix is not visualized.  PERITONEUM: No ascites.  VESSELS: Atherosclerotic calcifications. Right femoral central venous   catheter terminates in the common iliac vein.  RETROPERITONEUM/LYMPH NODES: No lymphadenopathy.  ABDOMINAL WALL: Anasarca.  BONES: Osteopenia and degenerative change.  *  Mildly displaced left anterior fourth rib fracture.  *  Nondisplaced fracture of the left anterior lateral eighth rib.  *  Mildly displaced right third and fourth anterior lateral rib fractures.    IMPRESSION:  *  Bilateral lower lobe pneumonia with confluent consolidation in the   superior segment of the right lower lobe. Aspiration is a consideration.  *  Bilateral anterior rib fractures.      < end of copied text >    < from: CT Head No Cont (04.06.23 @ 18:29) >    ACC: [id number] EXAM:  CT BRAIN   ORDERED BY: MAYUR NASH     PROCEDURE DATE:  04/06/2023          INTERPRETATION:  CT HEAD    CLINICAL HISTORY: AMS, sd/p cardiac arrest    TECHNIQUE:  Noncontrast CT.  Axial Acquisition.  Sagittal and coronal reformations.    COMPARISON:  Compared to study dated 5/31/2020    FINDINGS:  HEMORRHAGE:  No evidence of intracranial hemorrhage.  BRAIN PARENCHYMA:  Moderate atrophy. Moderate confluent periventricular   and subcortical white matter ischemic changes  [place] or mass effect.  VENTRICLES / SHIFT:  Ventricular prominence thought related to brain   atrophy. Ventricular size is stable. No midline shift.  EXTRA-AXIAL / BASAL CISTERNS:  No extra-axial mass. Basal cisterns   preserved.  Atherosclerotic calcifications of the cavernous internal   carotid arteries.  CALVARIUM AND EXTRACRANIAL SOFT TISSUES:  No depressed calvarial fracture.  SINUSES, ORBITS, MASTOIDS:  Near-complete opacification of the left   maxillary sinus. Mucosal thickening left frontal sinus.    IMPRESSION:  No evidence of an acute intracranial hemorrhage, midline shift, or   hydrocephalus. Moderate atrophy with moderate confluent white matter   ischemic changes.  Interval follow-up if acute anoxic injury remains of clinical concern    --- End of Report ---    < end of copied text >        ADVANCE DIRECTIVES/TREATMENT PREFERENCES:  NR/DNI - MOLST, comfort measures only

## 2023-04-07 NOTE — PROGRESS NOTE ADULT - SUBJECTIVE AND OBJECTIVE BOX
Patient is a 87y old  Female who presents with a chief complaint of Cardiac arrest (06 Apr 2023 18:00)    BRIEF HOSPITAL COURSE:   Ms. Aguliar is a 86 YO F w/pmhx of HTN, HLD, DM, and dementia who presented to ED w/respiratory distress. Patient had a cardiac arrest. ROSC 8-10 minutes. Epi x 3, calcium x 1, and bicarb x 1. Patient was intubated. Patient in septic shock requiring levophed and vasopressin to maintain MAP. Patient also noted to be significantly acidotic w/severe hyperkalemia, now on bicarb infusion. Etiology of arrest likely hyperkalemic vs aspiration given that patient has been having difficulty w/her food     Events last 24 hours:   - Requiring significant vasopressor dosages   - On Bicarb gtt   - No UOP  - K slightly improved  - Acidosis slgihtly   - Received 1uPRBC    Review of Systems:  Unable to assess given clinical condition    PAST MEDICAL & SURGICAL HISTORY:  Diabetes mellitus      HTN (hypertension)      HLD (hyperlipidemia)      Depression      Dementia      No significant past surgical history          Medications:  piperacillin/tazobactam IVPB.. 3.375 Gram(s) IV Intermittent every 12 hours    norepinephrine Infusion 0.3 MICROgram(s)/kG/Min IV Continuous <Continuous>      fentaNYL   Infusion. 0.5 MICROgram(s)/kG/Hr IV Continuous <Continuous>            hydrocortisone sodium succinate Injectable 50 milliGRAM(s) IV Push every 6 hours    sodium bicarbonate  Infusion 0.496 mEq/kG/Hr IV Continuous <Continuous>      chlorhexidine 0.12% Liquid 15 milliLiter(s) Oral Mucosa every 12 hours  chlorhexidine 2% Cloths 1 Application(s) Topical <User Schedule>        Mode: AC/ CMV (Assist Control/ Continuous Mandatory Ventilation),[place]  RR (machine): 20  TV (machine): 350  FiO2: 40  PEEP: 6  ITime: 0.8  MAP: 15  PIP: 22      ICU Vital Signs Last 24 Hrs  T(C): 35.9 (06 Apr 2023 23:45), Max: 35.9 (06 Apr 2023 23:45)  T(F): 96.6 (06 Apr 2023 23:45), Max: 96.6 (06 Apr 2023 23:45)  HR: 114 (07 Apr 2023 00:07) (56 - 114)  BP: 142/60 (06 Apr 2023 23:45) (87/48 - 143/56)  BP(mean): 76 (06 Apr 2023 23:45) (65 - 79)  ABP: --  ABP(mean): --  RR: 16 (06 Apr 2023 23:45) (14 - 28)  SpO2: 99% (07 Apr 2023 00:07) (99% - 100%)    O2 Parameters below as of 06 Apr 2023 18:50  Patient On (Oxygen Delivery Method): ventilator            ABG - ( 06 Apr 2023 18:50 )  pH, Arterial: 7.080 pH, Blood: x     /  pCO2: 40    /  pO2: 211   / HCO3: 12    / Base Excess: -18.1 /  SaO2: 99.8                I&O's Detail      LABS:                        6.6    7.41  )-----------( 144      ( 06 Apr 2023 16:30 )             23.6     04-06    138  |  107  |  145.3<H>  ----------------------------<  214<H>  7.9<HH>   |  <6.0<LL>  |  7.07<H>    Ca    13.4<HH>      06 Apr 2023 16:30  Mg     2.5     04-06    TPro  6.2<L>  /  Alb  3.4  /  TBili  0.2<L>  /  DBili  x   /  AST  43<H>  /  ALT  48<H>  /  AlkPhos  117  04-06      CARDIAC MARKERS ( 06 Apr 2023 16:30 )  x     / 0.09 ng/mL / 151 U/L / x     / 12.9 ng/mL      CAPILLARY BLOOD GLUCOSE      POCT Blood Glucose.: 192 mg/dL (06 Apr 2023 16:48)    PT/INR - ( 06 Apr 2023 16:30 )   PT: 13.1 sec;   INR: 1.13 ratio         PTT - ( 06 Apr 2023 16:30 )  PTT:45.3 sec    CULTURES:    Physical Examination:  General: No acute distress.    PULM: Diminished breath sounds b/l   NECK: Supple, no lymphadenopathy, trachea midline  CVS: Regular rate and rhythm, no murmurs, rubs, or gallops  ABD: Soft, nondistended, nontender, normoactive bowel sounds, no masses  EXT: No edema, nontender  RADIOLOGY:   < from: CT Abdomen and Pelvis No Cont (04.06.23 @ 18:30) >    ACC: [id number] EXAM:  CT ABDOMEN AND PELVIS   ORDERED BY: BRENDA GALLOWAY     ACC: [id number] EXAM:  CT CHEST   ORDERED BY: BRENDA GALLOWAY     PROCEDURE DATE:  04/06/2023          INTERPRETATION:  CLINICAL INFORMATION: Cardiac arrest. Anemia.   Hypothermia.    COMPARISON: CT scan 5/31/2020    CONTRAST/COMPLICATIONS:  IV Contrast: NONE  Oral Contrast: NONE  Complications: None reported at time of study completion    PROCEDURE:  CT of the Chest, Abdomen and Pelvis was performed.  Sagittal and coronalreformats were performed.    FINDINGS:  CHEST:  LUNGS AND LARGE AIRWAYS: Patent central airways. Endotracheal tube in   appropriate position. Consolidation in the superior segment of the right   lower lobe with additional regions of clustered nodularopacity in both   lower lobes, consistent with pneumonia. Aspiration is a consideration.  PLEURA: No pleural effusion.  VESSELS: Within normal limits.  HEART: Heart size is normal. No pericardial effusion.  MEDIASTINUM AND GEORGE: No lymphadenopathy.  CHEST WALL AND LOWER NECK: Within normal limits.    ABDOMEN AND PELVIS:  LIVER: Within normal limits.  BILE DUCTS: Normal caliber.  GALLBLADDER: Cholelithiasis. Nonspecific gallbladder wall thickening.  SPLEEN: Within normal limits.  PANCREAS: Within normal limits.  ADRENALS: Within normal limits.  KIDNEYS/URETERS: No hydronephrosis. Fluid attenuation lesion in Morison's   pouch measuring 3.0 x 2.2 x 4.0 cm, without definitive association with   the liver or kidney, not significantly changed from prior exam.    BLADDER: Decompressed with a Recio catheter.  REPRODUCTIVE ORGANS: Hysterectomy.    BOWEL: Nasogastric tube terminates in the distal stomach. No bowel   obstruction. Appendix is not visualized.  PERITONEUM: No ascites.  VESSELS: Atherosclerotic calcifications. Right femoral central venous   catheter terminates in the common iliac vein.  RETROPERITONEUM/LYMPH NODES: No lymphadenopathy.  ABDOMINAL WALL: Anasarca.  BONES: Osteopenia and degenerative change.  *  Mildly displaced left anterior fourth rib fracture.  *  Nondisplaced fracture of the left anterior lateral eighth rib.  *  Mildly displaced right third and fourth anterior lateral rib fractures.    IMPRESSION:  *  Bilateral lower lobe pneumonia with confluent consolidation in the   superior segment of the right lower lobe. Aspiration is a consideration.  *  Bilateral anterior rib fractures.        --- End of Report ---            MOSES ANDREWS MD; Attending Radiologist  This document has been electronically signed. Apr  6 2023  7:08PM    < end of copied text >

## 2023-04-07 NOTE — CONSULT NOTE ADULT - ASSESSMENT
87yr woman hx HLD, DM, and dementia ( has HHA) admitted with septic shock, s/p cardiac arrest achieved ROSC in [facility]     Acute Respiratory Failure  intubated  Plan for elective withdrawal  Dilaudid infusion more seamless symptom relief   Ativan 2mg IVP  q4hr  PRN      Septic Shock  on IV abx   on comfort care - d/c  on pressors - to be d/c    MEREDITH  avoid nephrotoxins  Dilaudid for comfort care    Rib fractures  Plan for comfort care  Started Dilaudid infusion 2mg/hr    Encounter for Palliative Care  See GOC above for details.  In summary  1.  Cousin Madina Connell is HCP.  Her brother Adam was primary but has passed away.  HCP in chart  2.  Comfort care with plan for vent withdrawal  3.  Non essential meds deescalated

## 2023-04-07 NOTE — CONSULT NOTE ADULT - NSCONSULTADDITIONALINFOA_GEN_ALL_CORE
Time spent with review of chart documents, labs, imaging. Direct patient assessment,  formulation of care plan. Discussion with  Interdisciplinary  team  Dr. Yousif,  RN, family   including ACP  _20__minutes with  MOLST __ completion.

## 2023-04-07 NOTE — CONSULT NOTE ADULT - CONVERSATION DETAILS
HCP in chart naming  Adam Connell as primary proxy and  Madina Connell as secondary.    Madina Medina is her brother who passed away in 2022.    Madina gallegos patient has hx of dementia, has HHA  Terry for the last 7 years.  They were informed of patient's condition, in [facility]  and given her baseline debility and advance age, has poor reserve.  [place]  is poor. Madina  states  there were signs that patient was ready and does not want her cousin to undergo further intervention and feels focusing on her comfort is what her cousin would want.  Terry also agreed.     Informed them of plan for comfort care whereby focus would be pain and symptom management.  No further labs,   Discussed elective vent withdrawal for which they agreed.  They will let us know when they are ready.    Offered for other family members to come for which Madina gallegos they had been informed, and will just be the two of them.    Psychosocial support.

## 2023-04-07 NOTE — PROGRESS NOTE ADULT - ASSESSMENT
Assessment:  Ms. Aguilar is a 86 YO F w/pmhx of HTN, HLD, DM, and dementia who presented to ED w/respiratory distress. Patient had a cardiac arrest. ROSC 8-10 minutes. Epi x 3, calcium x 1, and bicarb x 1. Patient was intubated. Patient in septic shock requiring levophed and vasopressin to maintain MAP. Patient also noted to be significantly acidotic w/severe hyperkalemia, now on bicarb infusion. Etiology of arrest likely hyperkalemic vs aspiration given that patient has been having difficulty w/her food     Problem List:  1. Cardiac arrest  2. Acute hypoxic respiratory failure  3. Pneumonia  4. Hyperkalemia  5. Metabolic acidosis  6. MEREDITH   7. Anemia  8. Shock, unspecified     Plan:  Neuro: Monitor mental status, avoid deliriogenic medications. Pain & fever control as needed  CV: Shock state, septic vs cardiogenic (stunned myocardium s/p arrest). F/u TTE. Actively titrating levophed to maintain MAP>65mmHg. On fixed dose vasopressin. stress dose steroids   Pulm: Patient currently on Full vent support. Titrate settings to maintain SaO2 >90%, or pH >7.25. Consider low tidal volume ventilation strategy w/ goal Tv 4-6 cc/kg of ideal body weight. Plateau pressure goal <30. Peridex oral care. Aggressive pulmonary toilet. Daily sedation vacation with spontaneous breathing trial if clinical condition warrants, discuss with respiratory therapy. Treat for pneumonia   Renal: MEREDITH, hyperkalemia, metabolic acidosis. Being treated w/bicarb gtt. family does not want HD right now, K slowly improving and acidosis slowly improving w/bicarb gtt. Strict I/Os, goal UOP >0.5cc/kg/hr. Trend renal function and electrolytes, replete as needed. Avoid nephrotoxic agents  GI: NPO PPI   ID: [email or web address] for broad spectrum coverage of aspiration, f/u cultures   Heme: S/p 1uPRBC, f/u H&H. No chemical DVT prophylaxis at this time   Endo: Goal blood glucose <180. AM TSH, Lipids, and A1c  Dispo: DNR    Discussed case w/MIC[facility] attending, Dr. Cooper     CRITICAL CARE TIME SPENT: 48 minutes assessing presenting problems of acute illness, which pose high probability of life threatening deterioration or end organ damage/dysfunction, as well as medical decision making including initiating plan of care, reviewing data, reviewing radiologic exams, discussing with multidisciplinary team,  discussing goals of care with patient/family, and writing this note.  Non-inclusive of procedures performed

## 2023-04-07 NOTE — DISCHARGE NOTE FOR THE EXPIRED PATIENT - HOSPITAL COURSE
Pt is an 86 y/o F pmhx of HTN, HLD, DM, and dementia who was BIBEMS for HELMS/respiratory distress. Pt experienced cardiac arrest upon presentation to ED, Intubated for hypoxia and underwent  2 rounds of CPR w/ 2 EPI's given w/ ROSC. Pt started on levophed for HD support during shock state, Initial ABG shows severe acidosis, w/ elevated potassium, treated w/ insulin and calcium. As per Family at bedside pt had been lethargic yesterday and experienced difficulty w/ her food at dinner, then today began working harder to breath which prompted them to call EMS. Pt made DNR by family. ICU consulted for further management. Course complicated by worsening renal failure and hyperkalemia, transitioned to comfort measures only on 23, pt  w/ family at bedside at 16:44.

## 2023-04-17 NOTE — CHART NOTE - NSCHARTNOTEFT_GEN_A_CORE
Palliative care social work note.    Bereavement call made to patients nell Painter. Support and resources offered.

## 2023-04-20 PROCEDURE — 82962 GLUCOSE BLOOD TEST: CPT

## 2023-04-20 PROCEDURE — 71045 X-RAY EXAM CHEST 1 VIEW: CPT

## 2023-04-20 PROCEDURE — 94003 VENT MGMT INPAT SUBQ DAY: CPT

## 2023-04-20 PROCEDURE — 84443 ASSAY THYROID STIM HORMONE: CPT

## 2023-04-20 PROCEDURE — 70450 CT HEAD/BRAIN W/O DYE: CPT | Mod: MA

## 2023-04-20 PROCEDURE — 80053 COMPREHEN METABOLIC PANEL: CPT

## 2023-04-20 PROCEDURE — 80048 BASIC METABOLIC PNL TOTAL CA: CPT

## 2023-04-20 PROCEDURE — 83880 ASSAY OF NATRIURETIC PEPTIDE: CPT

## 2023-04-20 PROCEDURE — 84484 ASSAY OF TROPONIN QUANT: CPT

## 2023-04-20 PROCEDURE — 83735 ASSAY OF MAGNESIUM: CPT

## 2023-04-20 PROCEDURE — 96365 THER/PROPH/DIAG IV INF INIT: CPT

## 2023-04-20 PROCEDURE — 36430 TRANSFUSION BLD/BLD COMPNT: CPT

## 2023-04-20 PROCEDURE — U0005: CPT

## 2023-04-20 PROCEDURE — 85730 THROMBOPLASTIN TIME PARTIAL: CPT

## 2023-04-20 PROCEDURE — 71250 CT THORAX DX C-: CPT

## 2023-04-20 PROCEDURE — 84132 ASSAY OF SERUM POTASSIUM: CPT

## 2023-04-20 PROCEDURE — 94002 VENT MGMT INPAT INIT DAY: CPT

## 2023-04-20 PROCEDURE — U0003: CPT

## 2023-04-20 PROCEDURE — 99285 EMERGENCY DEPT VISIT HI MDM: CPT | Mod: 25

## 2023-04-20 PROCEDURE — 84295 ASSAY OF SERUM SODIUM: CPT

## 2023-04-20 PROCEDURE — 96375 TX/PRO/DX INJ NEW DRUG ADDON: CPT | Mod: XU

## 2023-04-20 PROCEDURE — 85027 COMPLETE CBC AUTOMATED: CPT

## 2023-04-20 PROCEDURE — 83605 ASSAY OF LACTIC ACID: CPT

## 2023-04-20 PROCEDURE — 36415 COLL VENOUS BLD VENIPUNCTURE: CPT

## 2023-04-20 PROCEDURE — 85610 PROTHROMBIN TIME: CPT

## 2023-04-20 PROCEDURE — 86923 COMPATIBILITY TEST ELECTRIC: CPT

## 2023-04-20 PROCEDURE — 93005 ELECTROCARDIOGRAM TRACING: CPT

## 2023-04-20 PROCEDURE — 36600 WITHDRAWAL OF ARTERIAL BLOOD: CPT

## 2023-04-20 PROCEDURE — 94640 AIRWAY INHALATION TREATMENT: CPT

## 2023-04-20 PROCEDURE — 82435 ASSAY OF BLOOD CHLORIDE: CPT

## 2023-04-20 PROCEDURE — 84100 ASSAY OF PHOSPHORUS: CPT

## 2023-04-20 PROCEDURE — 74176 CT ABD & PELVIS W/O CONTRAST: CPT

## 2023-04-20 PROCEDURE — 85018 HEMOGLOBIN: CPT

## 2023-04-20 PROCEDURE — 85025 COMPLETE CBC W/AUTO DIFF WBC: CPT

## 2023-04-20 PROCEDURE — 86901 BLOOD TYPING SEROLOGIC RH(D): CPT

## 2023-04-20 PROCEDURE — 86850 RBC ANTIBODY SCREEN: CPT

## 2023-04-20 PROCEDURE — 82947 ASSAY GLUCOSE BLOOD QUANT: CPT

## 2023-04-20 PROCEDURE — C1751: CPT

## 2023-04-20 PROCEDURE — 82330 ASSAY OF CALCIUM: CPT

## 2023-04-20 PROCEDURE — 87040 BLOOD CULTURE FOR BACTERIA: CPT

## 2023-04-20 PROCEDURE — P9040: CPT

## 2023-04-20 PROCEDURE — 36556 INSERT NON-TUNNEL CV CATH: CPT

## 2023-04-20 PROCEDURE — 85014 HEMATOCRIT: CPT

## 2023-04-20 PROCEDURE — 82803 BLOOD GASES ANY COMBINATION: CPT

## 2023-04-20 PROCEDURE — 82553 CREATINE MB FRACTION: CPT

## 2023-04-20 PROCEDURE — 86900 BLOOD TYPING SEROLOGIC ABO: CPT

## 2023-04-20 PROCEDURE — 82550 ASSAY OF CK (CPK): CPT

## 2024-12-20 NOTE — H&P ADULT - PROBLEM SELECTOR PLAN 5
Referral placed to pulmonary medicine for 7 mm nodule found on CT.     The CT showed descending thoracic aortic aneurysm is was 4.6 cm and is now 5.1 cm. This will be reviewed with Dr. Plata for further recommendations.    Echo stable.     CT 12/16/2024  1. 7 mm, subsolid right lower lobe pulmonary nodule represents a new  finding. Follow-up exam is recommended in 6 months to reassess the finding.  2. Stable, 4 mm left apical nodule with multifocal areas of progressive  fibrosis.  3. 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 Exam is limited secondary to lack of contrast.  4. Additional stable ancillary findings as documented.       Echo 12/09/2024   * Normal left ventricular systolic function with regional wall motion  abnormality.    * Left ventricular ejection fraction; 56 %.    * Hypokinetic basal inferior segment (noted on prior study).    * Mildly increased left ventricular wall thickness.    * Grade I diastolic dysfunction of the left ventricle (impaired relaxation  pattern).    * Right ventricular systolic pressure; 34 mmHg.    * Mildly dilated proximal ascending aorta, 3.8cm (previously reported 3.4cm  on echo 12/16/22).    * Compared to prior study from 12/15/22, the LVEF remains stable, mild  increase in the proximal ascending aorta dimension.  
c/w moderate iss w/ meals  check hb a1c

## 2025-02-17 NOTE — ED PROVIDER NOTE - CPE EDP GASTRO NORM
[facility] care called to advise that patient is being discharged from skilled nursing and wants to know if patient would benefit from palliative care and if so can an order be placed. Please advise    Order put in   What Type Of Note Output Would You Prefer (Optional)?: Standard Output How Severe Are Your Spot(S)?: moderate Have Your Spot(S) Been Treated In The Past?: has not been treated Hpi Title: Evaluation of a Skin Lesion - - -